# Patient Record
Sex: FEMALE | Race: OTHER | Employment: FULL TIME | URBAN - METROPOLITAN AREA
[De-identification: names, ages, dates, MRNs, and addresses within clinical notes are randomized per-mention and may not be internally consistent; named-entity substitution may affect disease eponyms.]

---

## 2021-05-10 DIAGNOSIS — Z01.818 PRE-OP TESTING: Primary | ICD-10-CM

## 2021-05-10 DIAGNOSIS — M79.89 SOFT TISSUE MASS: ICD-10-CM

## 2021-05-12 ENCOUNTER — HOSPITAL ENCOUNTER (OUTPATIENT)
Age: 36
Discharge: HOME | End: 2021-05-12
Payer: COMMERCIAL

## 2021-05-12 DIAGNOSIS — Z01.818 PRE-OP TESTING: ICD-10-CM

## 2021-05-12 LAB — SARS-COV-2: NOT DETECTED

## 2021-05-12 PROCEDURE — U0005 INFEC AGEN DETEC AMPLI PROBE: HCPCS

## 2021-05-12 PROCEDURE — U0003 INFECTIOUS AGENT DETECTION BY NUCLEIC ACID (DNA OR RNA); SEVERE ACUTE RESPIRATORY SYNDROME CORONAVIRUS 2 (SARS-COV-2) (CORONAVIRUS DISEASE [COVID-19]), AMPLIFIED PROBE TECHNIQUE, MAKING USE OF HIGH THROUGHPUT TECHNOLOGIES AS DESCRIBED BY CMS-2020-01-R: HCPCS

## 2021-05-14 NOTE — PROGRESS NOTES

## 2021-05-17 ENCOUNTER — ANESTHESIA EVENT (OUTPATIENT)
Dept: OPERATING ROOM | Age: 36
End: 2021-05-17
Payer: COMMERCIAL

## 2021-05-19 ENCOUNTER — HOSPITAL ENCOUNTER (OUTPATIENT)
Age: 36
Setting detail: OUTPATIENT SURGERY
Discharge: HOME | End: 2021-05-19
Attending: SURGERY | Admitting: SURGERY
Payer: COMMERCIAL

## 2021-05-19 ENCOUNTER — ANESTHESIA (OUTPATIENT)
Dept: OPERATING ROOM | Age: 36
End: 2021-05-19
Payer: COMMERCIAL

## 2021-05-19 VITALS
RESPIRATION RATE: 22 BRPM | DIASTOLIC BLOOD PRESSURE: 69 MMHG | SYSTOLIC BLOOD PRESSURE: 116 MMHG | OXYGEN SATURATION: 100 %

## 2021-05-19 VITALS
HEART RATE: 70 BPM | HEIGHT: 64 IN | WEIGHT: 140 LBS | SYSTOLIC BLOOD PRESSURE: 133 MMHG | TEMPERATURE: 97.2 F | OXYGEN SATURATION: 100 % | DIASTOLIC BLOOD PRESSURE: 89 MMHG | BODY MASS INDEX: 23.9 KG/M2 | RESPIRATION RATE: 16 BRPM

## 2021-05-19 DIAGNOSIS — R22.31 MASS OF RIGHT AXILLA: Primary | ICD-10-CM

## 2021-05-19 LAB — PREGNANCY, URINE: NEGATIVE

## 2021-05-19 PROCEDURE — 88185 FLOWCYTOMETRY/TC ADD-ON: CPT

## 2021-05-19 PROCEDURE — 2500000003 HC RX 250 WO HCPCS: Performed by: NURSE ANESTHETIST, CERTIFIED REGISTERED

## 2021-05-19 PROCEDURE — 2500000003 HC RX 250 WO HCPCS: Performed by: SURGERY

## 2021-05-19 PROCEDURE — 3600000012 HC SURGERY LEVEL 2 ADDTL 15MIN: Performed by: SURGERY

## 2021-05-19 PROCEDURE — 6360000002 HC RX W HCPCS: Performed by: NURSE ANESTHETIST, CERTIFIED REGISTERED

## 2021-05-19 PROCEDURE — 84703 CHORIONIC GONADOTROPIN ASSAY: CPT

## 2021-05-19 PROCEDURE — 2580000003 HC RX 258: Performed by: ANESTHESIOLOGY

## 2021-05-19 PROCEDURE — 7100000010 HC PHASE II RECOVERY - FIRST 15 MIN: Performed by: SURGERY

## 2021-05-19 PROCEDURE — 7100000011 HC PHASE II RECOVERY - ADDTL 15 MIN: Performed by: SURGERY

## 2021-05-19 PROCEDURE — 38525 BIOPSY/REMOVAL LYMPH NODES: CPT | Performed by: SURGERY

## 2021-05-19 PROCEDURE — 88184 FLOWCYTOMETRY/ TC 1 MARKER: CPT

## 2021-05-19 PROCEDURE — 6370000000 HC RX 637 (ALT 250 FOR IP): Performed by: ANESTHESIOLOGY

## 2021-05-19 PROCEDURE — 3700000001 HC ADD 15 MINUTES (ANESTHESIA): Performed by: SURGERY

## 2021-05-19 PROCEDURE — 2709999900 HC NON-CHARGEABLE SUPPLY: Performed by: SURGERY

## 2021-05-19 PROCEDURE — 3600000002 HC SURGERY LEVEL 2 BASE: Performed by: SURGERY

## 2021-05-19 PROCEDURE — 88305 TISSUE EXAM BY PATHOLOGIST: CPT

## 2021-05-19 PROCEDURE — 3700000000 HC ANESTHESIA ATTENDED CARE: Performed by: SURGERY

## 2021-05-19 RX ORDER — PROMETHAZINE HYDROCHLORIDE 25 MG/ML
6.25 INJECTION, SOLUTION INTRAMUSCULAR; INTRAVENOUS
Status: DISCONTINUED | OUTPATIENT
Start: 2021-05-19 | End: 2021-05-19 | Stop reason: HOSPADM

## 2021-05-19 RX ORDER — LIDOCAINE HYDROCHLORIDE 10 MG/ML
0.3 INJECTION, SOLUTION EPIDURAL; INFILTRATION; INTRACAUDAL; PERINEURAL
Status: DISCONTINUED | OUTPATIENT
Start: 2021-05-19 | End: 2021-05-19 | Stop reason: HOSPADM

## 2021-05-19 RX ORDER — MEPERIDINE HYDROCHLORIDE 25 MG/ML
12.5 INJECTION INTRAMUSCULAR; INTRAVENOUS; SUBCUTANEOUS EVERY 5 MIN PRN
Status: DISCONTINUED | OUTPATIENT
Start: 2021-05-19 | End: 2021-05-19 | Stop reason: HOSPADM

## 2021-05-19 RX ORDER — SODIUM CHLORIDE 0.9 % (FLUSH) 0.9 %
5-40 SYRINGE (ML) INJECTION PRN
Status: DISCONTINUED | OUTPATIENT
Start: 2021-05-19 | End: 2021-05-19 | Stop reason: HOSPADM

## 2021-05-19 RX ORDER — FENTANYL CITRATE 50 UG/ML
INJECTION, SOLUTION INTRAMUSCULAR; INTRAVENOUS PRN
Status: DISCONTINUED | OUTPATIENT
Start: 2021-05-19 | End: 2021-05-19 | Stop reason: SDUPTHER

## 2021-05-19 RX ORDER — ONDANSETRON 2 MG/ML
4 INJECTION INTRAMUSCULAR; INTRAVENOUS
Status: DISCONTINUED | OUTPATIENT
Start: 2021-05-19 | End: 2021-05-19 | Stop reason: HOSPADM

## 2021-05-19 RX ORDER — TRAMADOL HYDROCHLORIDE 50 MG/1
50 TABLET ORAL ONCE
Status: COMPLETED | OUTPATIENT
Start: 2021-05-19 | End: 2021-05-19

## 2021-05-19 RX ORDER — MIDAZOLAM HYDROCHLORIDE 1 MG/ML
INJECTION INTRAMUSCULAR; INTRAVENOUS PRN
Status: DISCONTINUED | OUTPATIENT
Start: 2021-05-19 | End: 2021-05-19 | Stop reason: SDUPTHER

## 2021-05-19 RX ORDER — SODIUM CHLORIDE, SODIUM LACTATE, POTASSIUM CHLORIDE, CALCIUM CHLORIDE 600; 310; 30; 20 MG/100ML; MG/100ML; MG/100ML; MG/100ML
INJECTION, SOLUTION INTRAVENOUS CONTINUOUS
Status: DISCONTINUED | OUTPATIENT
Start: 2021-05-19 | End: 2021-05-19 | Stop reason: HOSPADM

## 2021-05-19 RX ORDER — OXYCODONE HYDROCHLORIDE 5 MG/1
5 TABLET ORAL EVERY 6 HOURS PRN
Qty: 15 TABLET | Refills: 0 | Status: SHIPPED | OUTPATIENT
Start: 2021-05-19 | End: 2021-05-26

## 2021-05-19 RX ORDER — HYDRALAZINE HYDROCHLORIDE 20 MG/ML
5 INJECTION INTRAMUSCULAR; INTRAVENOUS EVERY 10 MIN PRN
Status: DISCONTINUED | OUTPATIENT
Start: 2021-05-19 | End: 2021-05-19 | Stop reason: HOSPADM

## 2021-05-19 RX ORDER — OXYCODONE HYDROCHLORIDE AND ACETAMINOPHEN 5; 325 MG/1; MG/1
1 TABLET ORAL PRN
Status: DISCONTINUED | OUTPATIENT
Start: 2021-05-19 | End: 2021-05-19 | Stop reason: HOSPADM

## 2021-05-19 RX ORDER — ONDANSETRON 2 MG/ML
INJECTION INTRAMUSCULAR; INTRAVENOUS PRN
Status: DISCONTINUED | OUTPATIENT
Start: 2021-05-19 | End: 2021-05-19 | Stop reason: SDUPTHER

## 2021-05-19 RX ORDER — LIDOCAINE HYDROCHLORIDE 20 MG/ML
INJECTION, SOLUTION INFILTRATION; PERINEURAL PRN
Status: DISCONTINUED | OUTPATIENT
Start: 2021-05-19 | End: 2021-05-19 | Stop reason: SDUPTHER

## 2021-05-19 RX ORDER — LABETALOL HYDROCHLORIDE 5 MG/ML
5 INJECTION, SOLUTION INTRAVENOUS EVERY 10 MIN PRN
Status: DISCONTINUED | OUTPATIENT
Start: 2021-05-19 | End: 2021-05-19 | Stop reason: HOSPADM

## 2021-05-19 RX ORDER — MORPHINE SULFATE 10 MG/ML
1 INJECTION, SOLUTION INTRAMUSCULAR; INTRAVENOUS EVERY 5 MIN PRN
Status: DISCONTINUED | OUTPATIENT
Start: 2021-05-19 | End: 2021-05-19 | Stop reason: HOSPADM

## 2021-05-19 RX ORDER — MORPHINE SULFATE 10 MG/ML
2 INJECTION, SOLUTION INTRAMUSCULAR; INTRAVENOUS EVERY 5 MIN PRN
Status: DISCONTINUED | OUTPATIENT
Start: 2021-05-19 | End: 2021-05-19 | Stop reason: HOSPADM

## 2021-05-19 RX ORDER — PROPOFOL 10 MG/ML
INJECTION, EMULSION INTRAVENOUS PRN
Status: DISCONTINUED | OUTPATIENT
Start: 2021-05-19 | End: 2021-05-19 | Stop reason: SDUPTHER

## 2021-05-19 RX ORDER — SODIUM CHLORIDE 9 MG/ML
25 INJECTION, SOLUTION INTRAVENOUS PRN
Status: DISCONTINUED | OUTPATIENT
Start: 2021-05-19 | End: 2021-05-19 | Stop reason: HOSPADM

## 2021-05-19 RX ORDER — KETOROLAC TROMETHAMINE 30 MG/ML
INJECTION, SOLUTION INTRAMUSCULAR; INTRAVENOUS PRN
Status: DISCONTINUED | OUTPATIENT
Start: 2021-05-19 | End: 2021-05-19 | Stop reason: SDUPTHER

## 2021-05-19 RX ORDER — OXYCODONE HYDROCHLORIDE AND ACETAMINOPHEN 5; 325 MG/1; MG/1
2 TABLET ORAL PRN
Status: DISCONTINUED | OUTPATIENT
Start: 2021-05-19 | End: 2021-05-19 | Stop reason: HOSPADM

## 2021-05-19 RX ORDER — SODIUM CHLORIDE 0.9 % (FLUSH) 0.9 %
5-40 SYRINGE (ML) INJECTION EVERY 12 HOURS SCHEDULED
Status: DISCONTINUED | OUTPATIENT
Start: 2021-05-19 | End: 2021-05-19 | Stop reason: HOSPADM

## 2021-05-19 RX ORDER — DIPHENHYDRAMINE HYDROCHLORIDE 50 MG/ML
12.5 INJECTION INTRAMUSCULAR; INTRAVENOUS
Status: DISCONTINUED | OUTPATIENT
Start: 2021-05-19 | End: 2021-05-19 | Stop reason: HOSPADM

## 2021-05-19 RX ADMIN — ONDANSETRON 4 MG: 2 INJECTION, SOLUTION INTRAMUSCULAR; INTRAVENOUS at 10:01

## 2021-05-19 RX ADMIN — SODIUM CHLORIDE, POTASSIUM CHLORIDE, SODIUM LACTATE AND CALCIUM CHLORIDE: 600; 310; 30; 20 INJECTION, SOLUTION INTRAVENOUS at 07:58

## 2021-05-19 RX ADMIN — TRAMADOL HYDROCHLORIDE 50 MG: 50 TABLET, FILM COATED ORAL at 11:12

## 2021-05-19 RX ADMIN — FENTANYL CITRATE 50 MCG: 50 INJECTION INTRAMUSCULAR; INTRAVENOUS at 10:33

## 2021-05-19 RX ADMIN — KETOROLAC TROMETHAMINE 30 MG: 30 INJECTION, SOLUTION INTRAMUSCULAR at 10:22

## 2021-05-19 RX ADMIN — PROPOFOL 400 MG: 10 INJECTION, EMULSION INTRAVENOUS at 10:00

## 2021-05-19 RX ADMIN — PROPOFOL 100 MG: 10 INJECTION, EMULSION INTRAVENOUS at 10:15

## 2021-05-19 RX ADMIN — MIDAZOLAM 1 MG: 1 INJECTION INTRAMUSCULAR; INTRAVENOUS at 09:55

## 2021-05-19 RX ADMIN — LIDOCAINE HYDROCHLORIDE 50 MG: 20 INJECTION, SOLUTION INFILTRATION; PERINEURAL at 10:00

## 2021-05-19 RX ADMIN — FENTANYL CITRATE 50 MCG: 50 INJECTION INTRAMUSCULAR; INTRAVENOUS at 10:30

## 2021-05-19 ASSESSMENT — PULMONARY FUNCTION TESTS
PIF_VALUE: 0

## 2021-05-19 ASSESSMENT — PAIN DESCRIPTION - PAIN TYPE: TYPE: SURGICAL PAIN

## 2021-05-19 ASSESSMENT — PAIN DESCRIPTION - LOCATION: LOCATION: OTHER (COMMENT)

## 2021-05-19 ASSESSMENT — PAIN SCALES - GENERAL: PAINLEVEL_OUTOF10: 0

## 2021-05-19 NOTE — ANESTHESIA POSTPROCEDURE EVALUATION
Department of Anesthesiology  Postprocedure Note    Patient: Merlin Pikes  MRN: [id number]  YOB: 1985  Date of evaluation: 5/19/2021  Time:  12:58 PM     Procedure Summary     Date: 05/19/21 Room / Location: [address] [place] [address] / [facility]    Anesthesia Start: 1584 Anesthesia Stop: 0119    Procedure: EXCISION SOFT TISSUE MASS RIGHT AXILLA (Right Axilla) Diagnosis: (SOFT TISSUE MASS)    Surgeons: Kat Kumar MD Responsible Provider: Aparna Pfeiffer MD    Anesthesia Type: General ASA Status: 1          Anesthesia Type: General    Yahaira Phase I: Yahaira Score: 10    Yahaira Phase II: Yahaira Score: 9    Last vitals: Reviewed and per EMR flowsheets. Anesthesia Post Evaluation    Patient location during evaluation: PACU  Patient participation: complete - patient participated  Level of consciousness: awake and alert  Airway patency: patent  Nausea & Vomiting: no nausea and no vomiting  Complications: no  Cardiovascular status: blood pressure returned to baseline  Respiratory status: acceptable  Hydration status: euvolemic  Comments: VSS on transfer to phase 2 recovery. No anesthetic complications.

## 2021-05-19 NOTE — ANESTHESIA PRE PROCEDURE
Department of Anesthesiology  Preprocedure Note       Name:  Amelie Jacome   Age:  30 y.o.  :  1985                                          MRN:  [id number]         Date:  2021      Surgeon: Emre Walker):  Shakira Mariscal MD    Procedure: Procedure(s):  EXCISION SOFT TISSUE MASS RIGHT AXILLA    Medications prior to admission:   Prior to Admission medications    Not on File       Current medications:    Current Facility-Administered Medications   Medication Dose Route Frequency Provider Last Rate Last Admin    lactated ringers infusion   Intravenous Continuous Alin Cerda  mL/hr at 21 0758 New Bag at 21 0758    sodium chloride flush 0.9 % injection 5-40 mL  5-40 mL Intravenous 2 times per day Alin Cerda MD        sodium chloride flush 0.9 % injection 5-40 mL  5-40 mL Intravenous PRN Alin Cerda MD        0.9 % sodium chloride infusion  25 mL Intravenous PRN Alin Cerda MD        lidocaine PF 1 % injection 0.3 mL  0.3 mL Intradermal Once PRN Alin Cerda MD         Facility-Administered Medications Ordered in Other Encounters   Medication Dose Route Frequency Provider Last Rate Last Admin    lidocaine 2 % injection   Intravenous PRN Everlean Pamlico, APRN - CRNA   50 mg at 21 1000    midazolam (VERSED) injection   Intravenous PRN Everlean Pamlico, APRN - CRNA   1 mg at 21 0955    ondansetron (ZOFRAN) injection   Intravenous PRN Everlean Pamlico, APRN - CRNA   4 mg at 21 1001       Allergies: Allergies   Allergen Reactions    Other      Pt states opioids cause her blood pressure to drop    Pcn [Penicillins]      Not sure        Problem List:    Patient Active Problem List   Diagnosis Code    Chronic abdominal pain R10.9, G89.29    Adnexal cyst N94.9    Soft tissue mass M79.89       Past Medical History:  History reviewed. No pertinent past medical history. Past Surgical History:  History reviewed. No pertinent surgical history. Social History:    Social History     Tobacco Use    Smoking status: Former Smoker    Smokeless tobacco: Never Used   Substance Use Topics    Alcohol use: Yes     Alcohol/week: 0.0 standard drinks     Comment: occass                                Counseling given: Not Answered      Vital Signs (Current):   Vitals:    05/14/21 1520 05/19/21 0748   BP:  115/81   Pulse:  63   Resp:  18   Temp:  98.8 °F (37.1 °C)   TempSrc:  Infrared   SpO2:  100%   Weight: 140 lb (63.5 kg) 140 lb (63.5 kg)   Height: 5' 4\" (1.626 m) 5' 4\" (1.626 m)                                              BP Readings from Last 3 Encounters:   05/19/21 115/81   08/02/17 (!) 155/87   04/07/16 122/68       NPO Status: Time of last liquid consumption: 2000                        Time of last solid consumption: 2000                        Date of last liquid consumption: 05/18/21                        Date of last solid food consumption: 05/18/21    BMI:   Wt Readings from Last 3 Encounters:   05/19/21 140 lb (63.5 kg)   08/02/17 125 lb (56.7 kg)   04/07/16 122 lb (55.3 kg)     Body mass index is 24.03 kg/m². CBC:   Lab Results   Component Value Date    WBC 8.1 03/22/2016    RBC 4.58 03/22/2016    HGB 13.9 03/22/2016    HCT 41.6 03/22/2016    MCV 91.0 03/22/2016    RDW 12.3 03/22/2016     03/22/2016       CMP:   Lab Results   Component Value Date     03/22/2016    K 3.4 03/22/2016     03/22/2016    CO2 26 03/22/2016    BUN 10 03/22/2016    CREATININE 0.8 03/22/2016    GFRAA >60 03/22/2016    AGRATIO 1.4 03/22/2016    LABGLOM >60 03/22/2016    GLUCOSE 79 03/22/2016    PROT 7.1 03/22/2016    CALCIUM 9.0 03/22/2016    BILITOT 0.5 03/22/2016    ALKPHOS 41 03/22/2016    AST 16 03/22/2016    ALT 15 03/22/2016       POC Tests: No results for input(s): POCGLU, POCNA, POCK, POCCL, POCBUN, POCHEMO, POCHCT in the last 72 hours.     Coags: No results found for: PROTIME, INR, APTT    HCG (If Applicable):   Lab Results   Component Value Date PREGTESTUR Negative 05/19/2021        ABGs: No results found for: PHART, PO2ART, IOH5FDS, YQW6YDA, BEART, N1UUTQZO     Type & Screen (If Applicable):  No results found for: LABABO, LABRH    Drug/Infectious Status (If Applicable):  No results found for: HIV, HEPCAB    COVID-19 Screening (If Applicable):   Lab Results   Component Value Date    COVID19 Not Detected 05/12/2021           Anesthesia Evaluation   no history of anesthetic complications:   Airway: Mallampati: II  TM distance: >3 FB   Neck ROM: full  Mouth opening: > = 3 FB Dental: normal exam         Pulmonary:Negative Pulmonary ROS                              Cardiovascular:Negative CV ROS                      Neuro/Psych:   Negative Neuro/Psych ROS              GI/Hepatic/Renal: Neg GI/Hepatic/Renal ROS            Endo/Other: Negative Endo/Other ROS                    Abdominal:           Vascular: negative vascular ROS. Anesthesia Plan      MAC     ASA 1     (Risks, benefits and alternatives of MAC anesthesia discussed with pt by CRNA. I agree with plan. Questions answered. Willing to proceed.)  Induction: intravenous. Anesthetic plan and risks discussed with patient.                       Maria Victoria Barros MD   5/19/2021

## 2021-05-19 NOTE — BRIEF OP NOTE
Brief Postoperative Note      Patient: Kindra Malone  YOB: 1985  MRN: [id number]    Date of Procedure: 5/19/2021    Pre-Op Diagnosis: SOFT TISSUE MASS    Post-Op Diagnosis: Same with lymphadenopathy       Procedure(s):  EXCISION SOFT TISSUE MASS RIGHT AXILLA    Surgeon(s):  Murtaza Maravilla MD    Assistant:  Surgical Assistant: Frandy Loya    Anesthesia: Monitor Anesthesia Care    Estimated Blood Loss (mL): Minimal    Complications: None    Specimens:   ID Type Source Tests Collected by Time Destination   A : RIGHT AXILLA MASS [facility] SURGICAL PATHOLOGY Murtaza Maravilla MD 5/19/2021 1014        Implants:  * No implants in log *      Drains: * No LDAs found *    Findings: As above    Electronically signed by Brenda Benoit MD on 5/19/2021 at 10:29 AM

## 2021-05-19 NOTE — H&P
Department of General Surgery - Adult  Surgical Service   Attending History and Physical        CHIEF COMPLAINT:  R axilla mass      History Obtained From:  patient    HISTORY OF PRESENT ILLNESS:    This patient is a 30 y.o. female who presents with lump R axilla that is enlarging and causing some acute pain and skin sensation disturbance. Past Medical History:    History reviewed. No pertinent past medical history. Past Surgical History:    History reviewed. No pertinent surgical history. Current Medications:  Current Facility-Administered Medications   Medication Dose Route Frequency Provider Last Rate Last Admin    lactated ringers infusion   Intravenous Continuous Ellen Boateng  mL/hr at 05/19/21 0758 New Bag at 05/19/21 0758    sodium chloride flush 0.9 % injection 5-40 mL  5-40 mL Intravenous 2 times per day Ellen Boateng MD        sodium chloride flush 0.9 % injection 5-40 mL  5-40 mL Intravenous PRN Ellen Boateng MD        0.9 % sodium chloride infusion  25 mL Intravenous PRN Ellen Boateng MD        lidocaine PF 1 % injection 0.3 mL  0.3 mL Intradermal Once PRN Ellen Boateng MD         Home Medications:  Prior to Admission medications    Not on File     Allergies: Other and Pcn [penicillins]      Social History:   TOBACCO:   reports that she has quit smoking. She has never used smokeless tobacco.  ETOH:   reports current alcohol use. Family History:       Problem Relation Age of Onset    Cancer Mother         hysterectomy      REVIEW OF SYSTEMS:    Patient reports no complaints related to eyes, ears, nose , throat or mouth. No chest pain or SOB. No urinary complaints or musculoskeletal complaints. No skin rashes, bleeding tendencies. Current GI complaints none.     PHYSICAL EXAM:    VITALS:  /81   Pulse 63   Temp 98.8 °F (37.1 °C) (Infrared)   Resp 18   Ht 5' 4\" (1.626 m)   Wt 140 lb (63.5 kg)   LMP 05/03/2021   SpO2 100%   Breastfeeding Yes   BMI

## 2021-05-21 ENCOUNTER — TELEPHONE (OUTPATIENT)
Dept: SURGERY | Age: 36
End: 2021-05-21

## 2021-05-21 NOTE — TELEPHONE ENCOUNTER
----- Message from Jose Cruz Carbajal MD sent at 5/20/2021  2:32 PM EDT -----  Tell the patient that the lump removed from R axilla was a lymph node and was benign. Follow up prn.

## 2021-05-22 NOTE — OP NOTE
[address]                 [address] [address]                                OPERATIVE REPORT    PATIENT NAME: Janae Adame                        :        1985  MED REC NO:   [id number]                          ROOM:  ACCOUNT NO:   [de-identified]                           ADMIT DATE: 2021  PROVIDER:     Yonis Trotter MD    DATE OF PROCEDURE:  2021    PREOPERATIVE DIAGNOSIS:  Right axilla mass. POSTOPERATIVE DIAGNOSIS:  Right axilla lymphadenopathy. OPERATION PERFORMED:  Excision of right axilla, mass which was a lymph  node. SURGEON:  Yonis Trotter MD    ANESTHESIA:  Total intravenous anesthesia. COMPLICATIONS:  None. ESTIMATED BLOOD LOSS:  Less than 50 mL. INDICATIONS FOR OPERATION:  A 80-year-old female who has noticed a mass  in the right axilla. It is enlarged. It is causing acute pain at  times. I recommended operative excision for tissue diagnosis. The  risks and benefits were explained. The patient understood them,  accepted them, and elected to proceed. DESCRIPTION OF OPERATION:  The patient was brought to the operating  room. Total intravenous anesthesia was initiated. She was prepped and  draped in usual surgical sterile fashion. Local anesthetic was  infiltrated. An incision was made overlying the mass. I dissected  down. The mass was palpated deep through the subcutaneous tissues. It  was a soft, but enlarged lymph node. Feeding vessels and lymphatics  were tied off. The mass was passed off as specimen. Hemostasis was  obtained. A layered Vicryl closure was performed including 4-0 Vicryl  at the skin. Benzoin and Steri-Strip dressing were placed. DISPOSITION:  The patient tolerated the procedure without any acute  complication.         Staci Mercedes MD    D: 2021 16:13:56       T: 2021 16:25:49     [email or web address]  Job#: [id number]     Doc#: [id number]    CC:

## 2021-06-24 ENCOUNTER — HOSPITAL ENCOUNTER (EMERGENCY)
Age: 36
Discharge: HOME | End: 2021-06-24
Attending: EMERGENCY MEDICINE
Payer: COMMERCIAL

## 2021-06-24 ENCOUNTER — APPOINTMENT (OUTPATIENT)
Dept: CT IMAGING | Age: 36
End: 2021-06-24
Payer: COMMERCIAL

## 2021-06-24 VITALS
TEMPERATURE: 97.3 F | OXYGEN SATURATION: 100 % | RESPIRATION RATE: 16 BRPM | WEIGHT: 130 LBS | DIASTOLIC BLOOD PRESSURE: 78 MMHG | HEIGHT: 64 IN | SYSTOLIC BLOOD PRESSURE: 116 MMHG | HEART RATE: 59 BPM | BODY MASS INDEX: 22.2 KG/M2
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
## 2021-06-24 DIAGNOSIS — R51.9 ACUTE NONINTRACTABLE HEADACHE, UNSPECIFIED HEADACHE TYPE: ICD-10-CM

## 2021-06-24 DIAGNOSIS — T17.308A CHOKING, INITIAL ENCOUNTER: ICD-10-CM
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
## 2021-06-24 DIAGNOSIS — M25.512 ACUTE PAIN OF LEFT SHOULDER: ICD-10-CM

## 2021-06-24 DIAGNOSIS — I67.1 BRAIN ANEURYSM: ICD-10-CM

## 2021-06-24 DIAGNOSIS — M54.2 NECK PAIN: ICD-10-CM

## 2021-06-24 DIAGNOSIS — S06.0X0A CONCUSSION WITHOUT LOSS OF CONSCIOUSNESS, INITIAL ENCOUNTER: ICD-10-CM

## 2021-06-24 LAB
A/G RATIO: 1.3 (ref 1.1–2.2)
ALBUMIN SERPL-MCNC: 4.8 G/DL (ref 3.4–5)
ALP BLD-CCNC: 75 U/L (ref 40–129)
ALT SERPL-CCNC: 14 U/L (ref 10–40)
ANION GAP SERPL CALCULATED.3IONS-SCNC: 10 MMOL/L (ref 3–16)
AST SERPL-CCNC: 12 U/L (ref 15–37)
BILIRUB SERPL-MCNC: <0.2 MG/DL (ref 0–1)
BUN BLDV-MCNC: 12 MG/DL (ref 7–20)
CALCIUM SERPL-MCNC: 9.6 MG/DL (ref 8.3–10.6)
CHLORIDE BLD-SCNC: 103 MMOL/L (ref 99–110)
CO2: 23 MMOL/L (ref 21–32)
CREAT SERPL-MCNC: 0.6 MG/DL (ref 0.6–1.1)
GFR AFRICAN AMERICAN: >60
GFR NON-AFRICAN AMERICAN: >60
GLOBULIN: 3.6 G/DL
GLUCOSE BLD-MCNC: 97 MG/DL (ref 70–99)
HCG QUALITATIVE: NEGATIVE
POTASSIUM SERPL-SCNC: 3.8 MMOL/L (ref 3.5–5.1)
SODIUM BLD-SCNC: 136 MMOL/L (ref 136–145)
TOTAL PROTEIN: 8.4 G/DL (ref 6.4–8.2)

## 2021-06-24 PROCEDURE — 70498 CT ANGIOGRAPHY NECK: CPT

## 2021-06-24 PROCEDURE — 6360000004 HC RX CONTRAST MEDICATION: Performed by: EMERGENCY MEDICINE

## 2021-06-24 PROCEDURE — 6370000000 HC RX 637 (ALT 250 FOR IP): Performed by: EMERGENCY MEDICINE

## 2021-06-24 PROCEDURE — 99283 EMERGENCY DEPT VISIT LOW MDM: CPT

## 2021-06-24 PROCEDURE — 80053 COMPREHEN METABOLIC PANEL: CPT

## 2021-06-24 PROCEDURE — 84703 CHORIONIC GONADOTROPIN ASSAY: CPT

## 2021-06-24 RX ORDER — METHOCARBAMOL 500 MG/1
500-1000 TABLET, FILM COATED ORAL 2 TIMES DAILY PRN
Qty: 12 TABLET | Refills: 0 | Status: SHIPPED | OUTPATIENT
Start: 2021-06-24 | End: 2021-06-27

## 2021-06-24 RX ORDER — ACETAMINOPHEN 325 MG/1
650 TABLET ORAL ONCE
Status: COMPLETED | OUTPATIENT
Start: 2021-06-24 | End: 2021-06-24

## 2021-06-24 RX ORDER — IBUPROFEN 600 MG/1
600 TABLET ORAL ONCE
Status: COMPLETED | OUTPATIENT
Start: 2021-06-24 | End: 2021-06-24

## 2021-06-24 RX ADMIN — IBUPROFEN 600 MG: 600 TABLET, FILM COATED ORAL at 14:11

## 2021-06-24 RX ADMIN — IOPAMIDOL 85 ML: 755 INJECTION, SOLUTION INTRAVENOUS at 15:19

## 2021-06-24 RX ADMIN — ACETAMINOPHEN 650 MG: 325 TABLET ORAL at 14:11

## 2021-06-24 ASSESSMENT — PAIN DESCRIPTION - PAIN TYPE
TYPE: ACUTE PAIN
TYPE: ACUTE PAIN

## 2021-06-24 ASSESSMENT — PAIN DESCRIPTION - LOCATION
LOCATION: NECK;BACK;HEAD
LOCATION: NECK;HEAD

## 2021-06-24 ASSESSMENT — ENCOUNTER SYMPTOMS
EYE PAIN: 0
NAUSEA: 0
BACK PAIN: 0
PHOTOPHOBIA: 0
ABDOMINAL PAIN: 0
VOMITING: 0
SHORTNESS OF BREATH: 0
COLOR CHANGE: 1
EYE REDNESS: 0

## 2021-06-24 ASSESSMENT — PAIN DESCRIPTION - ONSET: ONSET: SUDDEN

## 2021-06-24 ASSESSMENT — PAIN SCALES - GENERAL
PAINLEVEL_OUTOF10: 6
PAINLEVEL_OUTOF10: 8
PAINLEVEL_OUTOF10: 8

## 2021-06-24 ASSESSMENT — PAIN DESCRIPTION - DESCRIPTORS: DESCRIPTORS: DULL

## 2021-06-24 ASSESSMENT — PAIN DESCRIPTION - FREQUENCY: FREQUENCY: CONTINUOUS

## 2021-06-24 NOTE — FLOWSHEET NOTE
06/24/21 1309   Encounter Summary   Services provided to: Patient   Referral/Consult From: Multi-disciplinary team   Continue Visiting   (6/24 Emotional support, prayer)   Complexity of Encounter Low   Length of Encounter 30 minutes   Spiritual Assessment Completed Yes   Crisis   Type Emotional distress   Assessment Approachable;Tearful; Anxious; Hopeful; Shock   Intervention Prayer;Explored feelings, thoughts, concerns; Active listening;Sustaining presence/ Ministry of presence; Discussed illness/injury and it's impact; Discussed meaning/purpose;Discussed belief system/Rastafarian practices/jonah;Discussed relationship with God   Outcome Comfort;Expressed gratitude;Engaged in conversation; Shared life review;Encouraged; Hopeful

## 2021-06-24 NOTE — ED NOTES
1633- Call placed to transfer center for Neuro -surgery for consult.      1652- Call was returned with Flavio from neuro and spoke to Dr. Severa Cornwall  06/24/21 [address]  06/24/21 6994
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
## 2021-06-28 ENCOUNTER — OFFICE VISIT (OUTPATIENT)
Dept: INTERNAL MEDICINE CLINIC | Age: 36
End: 2021-06-28

## 2021-06-28 VITALS
HEIGHT: 64 IN | BODY MASS INDEX: 23.39 KG/M2 | WEIGHT: 137 LBS | TEMPERATURE: 98.5 F | DIASTOLIC BLOOD PRESSURE: 66 MMHG | OXYGEN SATURATION: 99 % | SYSTOLIC BLOOD PRESSURE: 122 MMHG | HEART RATE: 90 BPM
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
## 2021-06-28 DIAGNOSIS — J02.9 PHARYNGITIS WITH VIRAL SYNDROME: ICD-10-CM

## 2021-06-28 DIAGNOSIS — B34.9 PHARYNGITIS WITH VIRAL SYNDROME: ICD-10-CM

## 2021-06-28 DIAGNOSIS — Z00.00 ANNUAL PHYSICAL EXAM: Primary | ICD-10-CM

## 2021-06-28 DIAGNOSIS — I67.1 ANEURYSM OF ANTERIOR COMMUNICATING ARTERY: ICD-10-CM

## 2021-06-28 LAB
BASOPHILS ABSOLUTE: 0 K/UL (ref 0–0.2)
BASOPHILS RELATIVE PERCENT: 0.3 %
CHOLESTEROL, TOTAL: 211 MG/DL (ref 0–199)
EOSINOPHILS ABSOLUTE: 0.1 K/UL (ref 0–0.6)
EOSINOPHILS RELATIVE PERCENT: 1.8 %
HCT VFR BLD CALC: 41.8 % (ref 36–48)
HDLC SERPL-MCNC: 36 MG/DL (ref 40–60)
HEMOGLOBIN: 14.2 G/DL (ref 12–16)
LDL CHOLESTEROL CALCULATED: 117 MG/DL
LYMPHOCYTES ABSOLUTE: 0.7 K/UL (ref 1–5.1)
LYMPHOCYTES RELATIVE PERCENT: 8.8 %
MCH RBC QN AUTO: 31 PG (ref 26–34)
MCHC RBC AUTO-ENTMCNC: 34.1 G/DL (ref 31–36)
MCV RBC AUTO: 91 FL (ref 80–100)
MONOCYTES ABSOLUTE: 0.5 K/UL (ref 0–1.3)
MONOCYTES RELATIVE PERCENT: 6.5 %
NEUTROPHILS ABSOLUTE: 6.2 K/UL (ref 1.7–7.7)
NEUTROPHILS RELATIVE PERCENT: 82.6 %
PDW BLD-RTO: 12.3 % (ref 12.4–15.4)
PLATELET # BLD: 188 K/UL (ref 135–450)
PMV BLD AUTO: 8.8 FL (ref 5–10.5)
RBC # BLD: 4.59 M/UL (ref 4–5.2)
TRIGL SERPL-MCNC: 289 MG/DL (ref 0–150)
VLDLC SERPL CALC-MCNC: 58 MG/DL
WBC # BLD: 7.5 K/UL (ref 4–11)

## 2021-06-28 PROCEDURE — 99385 PREV VISIT NEW AGE 18-39: CPT | Performed by: INTERNAL MEDICINE

## 2021-06-28 PROCEDURE — 36415 COLL VENOUS BLD VENIPUNCTURE: CPT | Performed by: INTERNAL MEDICINE

## 2021-06-28 PROCEDURE — 99203 OFFICE O/P NEW LOW 30 MIN: CPT | Performed by: INTERNAL MEDICINE

## 2021-06-28 SDOH — ECONOMIC STABILITY: FOOD INSECURITY: WITHIN THE PAST 12 MONTHS, THE FOOD YOU BOUGHT JUST DIDN'T LAST AND YOU DIDN'T HAVE MONEY TO GET MORE.: NEVER TRUE

## 2021-06-28 SDOH — ECONOMIC STABILITY: FOOD INSECURITY: WITHIN THE PAST 12 MONTHS, YOU WORRIED THAT YOUR FOOD WOULD RUN OUT BEFORE YOU GOT MONEY TO BUY MORE.: NEVER TRUE

## 2021-06-28 ASSESSMENT — PATIENT HEALTH QUESTIONNAIRE - PHQ9
SUM OF ALL RESPONSES TO PHQ QUESTIONS 1-9: 1
SUM OF ALL RESPONSES TO PHQ9 QUESTIONS 1 & 2: 1
SUM OF ALL RESPONSES TO PHQ QUESTIONS 1-9: 1
SUM OF ALL RESPONSES TO PHQ QUESTIONS 1-9: 1
1. LITTLE INTEREST OR PLEASURE IN DOING THINGS: 0
2. FEELING DOWN, DEPRESSED OR HOPELESS: 1

## 2021-06-28 ASSESSMENT — SOCIAL DETERMINANTS OF HEALTH (SDOH): HOW HARD IS IT FOR YOU TO PAY FOR THE VERY BASICS LIKE FOOD, HOUSING, MEDICAL CARE, AND HEATING?: NOT HARD AT ALL
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
## 2021-06-29 NOTE — RESULT ENCOUNTER NOTE
Inform patient:  Your cholesterol and triglyceride are a little high which means your body has a difficult time digesting carbohydrates due to insulin resistance which may lead to diabetes in the future. Exercising more 30 minutes 5 days a week and cutting down on portion size along with a low cholesterol and low carbohydrates (avoiding sweets/alcohol) to lose weight will help reduce your cholesterol and triglyceride and decrease the risk of diabetes in the future. Your blood count is normal.  [facility].

## 2021-07-01 ENCOUNTER — CLINICAL DOCUMENTATION (OUTPATIENT)
Dept: SPIRITUAL SERVICES | Age: 36
End: 2021-07-01

## 2021-07-01 NOTE — PROGRESS NOTES
Visited Ofelia at [address] [address]. Called to follow-up for spiritual support. Didn't answer phone, left voice message. Will attempt another call at a later date.

## 2021-12-21 ENCOUNTER — HOSPITAL ENCOUNTER (OUTPATIENT)
Dept: MRI IMAGING | Age: 36
Discharge: HOME | End: 2021-12-21
Payer: COMMERCIAL

## 2021-12-21 DIAGNOSIS — I67.1 CEREBRAL ANEURYSM, NONRUPTURED: ICD-10-CM

## 2021-12-21 PROCEDURE — 70544 MR ANGIOGRAPHY HEAD W/O DYE: CPT

## 2022-04-14 ENCOUNTER — OFFICE VISIT (OUTPATIENT)
Dept: ENT CLINIC | Age: 37
End: 2022-04-14
Payer: COMMERCIAL

## 2022-04-14 VITALS
BODY MASS INDEX: 24.75 KG/M2 | HEIGHT: 64 IN | SYSTOLIC BLOOD PRESSURE: 109 MMHG | WEIGHT: 145 LBS | DIASTOLIC BLOOD PRESSURE: 73 MMHG | TEMPERATURE: 97.6 F | HEART RATE: 65 BPM

## 2022-04-14 DIAGNOSIS — J04.0 LARYNGITIS: Primary | ICD-10-CM

## 2022-04-14 PROCEDURE — 99204 OFFICE O/P NEW MOD 45 MIN: CPT | Performed by: OTOLARYNGOLOGY

## 2022-04-14 RX ORDER — CLINDAMYCIN HYDROCHLORIDE 300 MG/1
300 CAPSULE ORAL 3 TIMES DAILY
Qty: 21 CAPSULE | Refills: 0 | Status: SHIPPED | OUTPATIENT
Start: 2022-04-14 | End: 2022-04-21

## 2022-04-14 RX ORDER — PREDNISONE 20 MG/1
40 TABLET ORAL DAILY
Qty: 10 TABLET | Refills: 0 | Status: SHIPPED | OUTPATIENT
Start: 2022-04-14 | End: 2022-04-19

## 2022-04-14 ASSESSMENT — ENCOUNTER SYMPTOMS
COUGH: 0
FACIAL SWELLING: 0
SORE THROAT: 1
EYE ITCHING: 0
SINUS PRESSURE: 0
SHORTNESS OF BREATH: 0
VOICE CHANGE: 1
APNEA: 0
TROUBLE SWALLOWING: 0

## 2022-04-14 NOTE — PROGRESS NOTES
Sujey Robert [address], [address], [address] [place] [address]  P: [phone]       Patient     Wei Hayes Page  1985    ChiefComplaint     Chief Complaint   Patient presents with    Pharyngitis     Patient states she has had a sore throat for 5 days now. She is also having a lot of phlegm. She also states she lost her voice for 3 days. History of Present Illness     Neptali Navarrete is a 31-year-old female here today for evaluation of loss of voice. Onset 5 days ago. Reports her child was sick for 1 day and shortly thereafter she became sick. Symptoms have not improved. Reports low-grade fever, loss of voice, sore throat and significant postnasal drainage. Past Medical History     History reviewed. No pertinent past medical history. Past Surgical History     Past Surgical History:   Procedure Laterality Date    ARM SURGERY Right 5/19/2021    EXCISION SOFT TISSUE MASS RIGHT AXILLA performed by Mabel Davila MD at [address] OR       Family History     Family History   Problem Relation Age of Onset    Cancer Mother         uterine     High Cholesterol Mother     Other Sister         Charles Hilding     High Cholesterol Sister        Social History     Social History     Tobacco Use    Smoking status: Never Smoker    Smokeless tobacco: Never Used   Vaping Use    Vaping Use: Never used   Substance Use Topics    Alcohol use: Not Currently    Drug use: No        Allergies     Allergies   Allergen Reactions    Other      Pt states opioids cause her blood pressure to drop    Pcn [Penicillins]      Not sure        Medications     Current Outpatient Medications   Medication Sig Dispense Refill    predniSONE (DELTASONE) 20 MG tablet Take 2 tablets by mouth daily for 5 days 10 tablet 0    clindamycin (CLEOCIN) 300 MG capsule Take 1 capsule by mouth 3 times daily for 7 days 21 capsule 0     No current facility-administered medications for this visit.        Review of Systems Review of Systems   Constitutional: Negative for appetite change, chills, fatigue, fever and unexpected weight change. HENT: Positive for postnasal drip, sore throat and voice change. Negative for congestion, ear discharge, ear pain, facial swelling, hearing loss, nosebleeds, sinus pressure, sneezing, tinnitus and trouble swallowing. Eyes: Negative for itching. Respiratory: Negative for apnea, cough and shortness of breath. Endocrine: Negative for cold intolerance and heat intolerance. Musculoskeletal: Negative for myalgias and neck pain. Skin: Negative for rash. Allergic/Immunologic: Negative for environmental allergies. Neurological: Negative for dizziness and headaches. Psychiatric/Behavioral: Negative for confusion, decreased concentration and sleep disturbance. PhysicalExam     Vitals:    04/14/22 1107   BP: 109/73   Site: Right Upper Arm   Position: Sitting   Pulse: 65   Temp: 97.6 °F (36.4 °C)   TempSrc: Infrared   Weight: 145 lb (65.8 kg)   Height: 5' 4\" (1.626 m)       Physical Exam  Constitutional:       General: She is not in acute distress. Appearance: She is well-developed. HENT:      Head: Normocephalic and atraumatic. Right Ear: Tympanic membrane, ear canal and external ear normal. No drainage. No middle ear effusion. Tympanic membrane is not bulging. Tympanic membrane has normal mobility. Left Ear: Tympanic membrane, ear canal and external ear normal. No drainage. No middle ear effusion. Tympanic membrane is not bulging. Tympanic membrane has normal mobility. Nose: No mucosal edema or rhinorrhea. Right Turbinates: Swollen. Left Turbinates: Swollen. Mouth/Throat:      Lips: Pink. Mouth: Mucous membranes are moist.      Tongue: No lesions. Palate: No mass. Pharynx: Uvula midline. Tonsils: 2+ on the right. 2+ on the left. Comments: Mirror exam was performed.   The nasopharynx, larynx,or hypopharynx were examined. Vocal fold motion was examined. Pertinent findings include: erythema bilateral TVC  Eyes:      Pupils: Pupils are equal, round, and reactive to light. Neck:      Thyroid: No thyroid mass or thyromegaly. Trachea: Trachea and phonation normal.   Cardiovascular:      Pulses: Normal pulses. Pulmonary:      Effort: Pulmonary effort is normal. No accessory muscle usage or respiratory distress. Breath sounds: No stridor. Musculoskeletal:      Cervical back: Full passive range of motion without pain. Lymphadenopathy:      Head:      Right side of head: No submental or submandibular adenopathy. Left side of head: No submental or submandibular adenopathy. Cervical: No cervical adenopathy. Right cervical: No superficial, deep or posterior cervical adenopathy. Left cervical: No superficial, deep or posterior cervical adenopathy. Skin:     General: Skin is warm and dry. Neurological:      Mental Status: She is alert and oriented to person, place, and time. Cranial Nerves: No cranial nerve deficit. Coordination: Coordination normal.      Gait: Gait normal.   Psychiatric:         Thought Content: Thought content normal.           Assessment and Plan     1. Laryngitis    - predniSONE (DELTASONE) 20 MG tablet; Take 2 tablets by mouth daily for 5 days  Dispense: 10 tablet; Refill: 0  - clindamycin (CLEOCIN) 300 MG capsule; Take 1 capsule by mouth 3 times daily for 7 days  Dispense: 21 capsule; Refill: 0      Return in 1 week if symptoms fail to improve    Barbara Jaquez DO  4/14/22      Portions of this note were dictated using Dragon.  There may be linguistic errors secondary to the use of this program.

## 2022-05-09 ENCOUNTER — TELEPHONE (OUTPATIENT)
Dept: INTERNAL MEDICINE CLINIC | Age: 37
End: 2022-05-09

## 2022-05-09 NOTE — TELEPHONE ENCOUNTER
----- Message from Howard Salmon sent at 5/6/2022  2:33 PM EDT -----  Subject: Appointment Request    Reason for Call: Urgent Abdominal Pain    QUESTIONS  Type of Appointment? Established Patient  Reason for appointment request? No appointments available during search  Additional Information for Provider? Patient is experiencing stomach pains   for week, she stated she is cramping and recently was on antibiotic, she   has some GI questions/concerns she would like to speak with   nurse/provider. Please contact patient to advise.   ---------------------------------------------------------------------------  --------------  CALL BACK INFO  What is the best way for the office to contact you? OK to leave message on   voicemail  Preferred Call Back Phone Number? [phone]  ---------------------------------------------------------------------------  --------------  SCRIPT ANSWERS  Relationship to Patient? Self  Do you have pain that has started or worsened within the past 24 hours? No  Are you vomiting blood or have bloody or black stool? No  Have you recently (14 days) seen a provider for this pain? No  Have you been diagnosed with, awaiting test results for, or told that you   are suspected of having COVID-19 (Coronavirus)? (If patient has tested   negative or was tested as a requirement for work, school, or travel and   not based on symptoms, answer no)? No  Within the past 10 days have you developed any of the following symptoms   (answer no if symptoms have been present longer than 10 days or began   more than 10 days ago)? Fever or Chills, Cough, Shortness of breath or   difficulty breathing, Loss of taste or smell, Sore throat, Nasal   congestion, Sneezing or runny nose, Fatigue or generalized body aches   (answer no if pain is specific to a body part e.g. back pain), Diarrhea,   Headache? No  Have you had close contact with someone with COVID-19 in the last 7 days?    No  (Service Expert  click yes below to proceed with [employer] As Usual   Scheduling)?  Yes

## 2022-05-09 NOTE — TELEPHONE ENCOUNTER
Spoke with patient, she said that the symptoms come and go and they seem to be getting better today. She does not want to come into the office or do a virtual visit today. She will call if symptoms change or worsen. Advised VV's today and tomorrow available.

## 2022-08-11 ENCOUNTER — HOSPITAL ENCOUNTER (OUTPATIENT)
Dept: MRI IMAGING | Age: 37
Discharge: HOME | End: 2022-08-11
Payer: COMMERCIAL

## 2022-08-11 DIAGNOSIS — I67.1 CEREBRAL ANEURYSM, NONRUPTURED: ICD-10-CM

## 2022-08-11 PROCEDURE — 70544 MR ANGIOGRAPHY HEAD W/O DYE: CPT

## 2022-12-27 ENCOUNTER — OFFICE VISIT (OUTPATIENT)
Dept: ENT CLINIC | Age: 37
End: 2022-12-27

## 2022-12-27 VITALS
WEIGHT: 145 LBS | DIASTOLIC BLOOD PRESSURE: 85 MMHG | HEART RATE: 64 BPM | SYSTOLIC BLOOD PRESSURE: 110 MMHG | TEMPERATURE: 97.4 F | OXYGEN SATURATION: 99 % | BODY MASS INDEX: 24.75 KG/M2 | HEIGHT: 64 IN

## 2022-12-27 DIAGNOSIS — J01.90 ACUTE RHINOSINUSITIS: ICD-10-CM

## 2022-12-27 DIAGNOSIS — K21.9 LARYNGOPHARYNGEAL REFLUX (LPR): ICD-10-CM

## 2022-12-27 DIAGNOSIS — R49.0 HOARSENESS OF VOICE: Primary | ICD-10-CM

## 2022-12-27 RX ORDER — FLUTICASONE PROPIONATE 50 MCG
1 SPRAY, SUSPENSION (ML) NASAL 2 TIMES DAILY
Qty: 16 G | Refills: 3 | Status: SHIPPED | OUTPATIENT
Start: 2022-12-27

## 2022-12-27 RX ORDER — PREDNISONE 20 MG/1
20 TABLET ORAL 2 TIMES DAILY
Qty: 10 TABLET | Refills: 0 | Status: SHIPPED | OUTPATIENT
Start: 2022-12-27 | End: 2023-01-01

## 2022-12-27 RX ORDER — OMEPRAZOLE 20 MG/1
20 CAPSULE, DELAYED RELEASE ORAL
Qty: 180 CAPSULE | Refills: 1 | Status: SHIPPED | OUTPATIENT
Start: 2022-12-27

## 2022-12-27 RX ORDER — DOXYCYCLINE HYCLATE 100 MG
100 TABLET ORAL 2 TIMES DAILY
Qty: 14 TABLET | Refills: 0 | Status: SHIPPED | OUTPATIENT
Start: 2022-12-27 | End: 2023-01-03

## 2022-12-27 RX ORDER — SODIUM CHLORIDE/SODIUM BICARB
1 PACKET (EA) NASAL 2 TIMES DAILY
Qty: 200 EACH | Refills: 1 | Status: SHIPPED | OUTPATIENT
Start: 2022-12-27

## 2022-12-27 ASSESSMENT — ENCOUNTER SYMPTOMS
FACIAL SWELLING: 0
SHORTNESS OF BREATH: 0
TROUBLE SWALLOWING: 0
VOICE CHANGE: 1
SINUS PRESSURE: 0
SORE THROAT: 1
APNEA: 0
COUGH: 0
EYE ITCHING: 0

## 2022-12-27 NOTE — PROGRESS NOTES
Sujey Robert [address], [address], [address] [place] [address]  P: [phone]       Patient     Bong Thomas. Jorge Numbers  1985    ChiefComplaint     Chief Complaint   Patient presents with    New Patient     Patient is here today for hoarseness. Patient states she has been hoarse for three days now. She states she  is very congested, and has thick green mucous. She has some drainage down the back of throat. Patient states she has been  blowing her nose so much and bad there is air coming out of the corner of left eye, also some popping from corner of left eye. She does have sick child at home. History of Present Illness     Promise Murray is a 76-year-old female here today for evaluation of loss of voice. Onset 5 days ago. Reports her child was sick for 1 day and shortly thereafter she became sick. Symptoms have not improved. Reports low-grade fever, loss of voice, sore throat and significant postnasal drainage. Interval history 12/27/2022: Last seen by Dr. Honey Meade 04/2022 with concern for hoarseness in the setting of laryngitis. Prescribed prednisone. Sore throat for 1.5 weeks approximately 3 weeks ago, had hoarse voice x 5 days, then resolved. 2 days ago she had recurrence of hoarse voice - no sore throat, no fevers, chills, night sweats - has waterbrash and sour taste in mouth (reflux), occasional throat spasms. States 4 days of thick green mucus from right nasal passage. Significant throat dryness. Drinks 2 cups of coffee/week, minimal soda. Past Medical History     No past medical history on file.     Past Surgical History     Past Surgical History:   Procedure Laterality Date    ARM SURGERY Right 5/19/2021    EXCISION SOFT TISSUE MASS RIGHT AXILLA performed by Lashawn Yo MD at [facility] OR       Family History     Family History   Problem Relation Age of Onset    Cancer Mother         uterine     High Cholesterol Mother     Other Sister         Lonn Ly     High Cholesterol Sister        Social History     Social History     Tobacco Use    Smoking status: Never    Smokeless tobacco: Never   Vaping Use    Vaping Use: Never used   Substance Use Topics    Alcohol use: Not Currently    Drug use: No        Allergies     Allergies   Allergen Reactions    Other      Pt states opioids cause her blood pressure to drop    Pcn [Penicillins]      Not sure        Medications     Current Outpatient Medications   Medication Sig Dispense Refill    predniSONE (DELTASONE) 20 MG tablet Take 1 tablet by mouth 2 times daily for 5 days 10 tablet 0    fluticasone (FLONASE) 50 MCG/ACT nasal spray 1 spray by Each Nostril route in the morning and at bedtime 16 g 3    omeprazole (PRILOSEC) 20 MG delayed release capsule Take 1 capsule by mouth 2 times daily (before meals) 180 capsule 1    doxycycline hyclate (VIBRA-TABS) 100 MG tablet Take 1 tablet by mouth 2 times daily for 7 days 14 tablet 0    Hypertonic Nasal Wash (SINUS RINSE REFILL) PACK 1 packet by Nasal route 2 times daily 200 each 1     No current facility-administered medications for this visit. Review of Systems     Review of Systems   Constitutional:  Negative for appetite change, chills, fatigue, fever and unexpected weight change. HENT:  Positive for postnasal drip, sore throat and voice change. Negative for congestion, ear discharge, ear pain, facial swelling, hearing loss, nosebleeds, sinus pressure, sneezing, tinnitus and trouble swallowing. Eyes:  Negative for itching. Respiratory:  Negative for apnea, cough and shortness of breath. Endocrine: Negative for cold intolerance and heat intolerance. Musculoskeletal:  Negative for myalgias and neck pain. Skin:  Negative for rash. Allergic/Immunologic: Negative for environmental allergies. Neurological:  Negative for dizziness and headaches. Psychiatric/Behavioral:  Negative for confusion, decreased concentration and sleep disturbance.         PhysicalExam Vitals:    12/27/22 1444   BP: 110/85   Site: Left Wrist   Position: Sitting   Pulse: 64   Temp: 97.4 °F (36.3 °C)   TempSrc: Infrared   SpO2: 99%   Weight: 145 lb (65.8 kg)   Height: 5' 4\" (1.626 m)       Physical Exam  Constitutional:       General: She is not in acute distress. Appearance: She is well-developed. HENT:      Head: Normocephalic and atraumatic. Right Ear: Ear canal and external ear normal. No drainage. No middle ear effusion. Tympanic membrane is scarred. Tympanic membrane is not bulging. Tympanic membrane has normal mobility. Left Ear: Ear canal and external ear normal. No drainage. No middle ear effusion. Tympanic membrane is scarred. Tympanic membrane is not bulging. Tympanic membrane has normal mobility. Nose: No mucosal edema or rhinorrhea. Right Turbinates: Swollen. Left Turbinates: Swollen. Mouth/Throat:      Lips: Pink. Mouth: Mucous membranes are moist.      Tongue: No lesions. Palate: No mass. Pharynx: Uvula midline. Tonsils: 2+ on the right. 2+ on the left. Comments: Significant asthenia, strain noted, no hoarseness or breathiness  Eyes:      Pupils: Pupils are equal, round, and reactive to light. Neck:      Thyroid: No thyroid mass or thyromegaly. Trachea: Trachea and phonation normal.   Cardiovascular:      Pulses: Normal pulses. Pulmonary:      Effort: Pulmonary effort is normal. No accessory muscle usage or respiratory distress. Breath sounds: No stridor. Musculoskeletal:      Cervical back: Full passive range of motion without pain. Lymphadenopathy:      Head:      Right side of head: No submental or submandibular adenopathy. Left side of head: No submental or submandibular adenopathy. Cervical: No cervical adenopathy. Right cervical: No superficial, deep or posterior cervical adenopathy. Left cervical: No superficial, deep or posterior cervical adenopathy.    Skin:     General: Skin is warm and dry. Neurological:      Mental Status: She is alert and oriented to person, place, and time. Cranial Nerves: No cranial nerve deficit. Coordination: Coordination normal.      Gait: Gait normal.   Psychiatric:         Thought Content: Thought content normal.     Flexible Laryngoscopy    Pre op: Hoarseness  Post op: Muscle tension dysphonia  Procedure : Flexible Nasopharyngolaryngoscopy  Surgeon: ESSIE Javier  Anesthesia: Afrin with 2% lidocaine  Estimated Blood Loss: None    Procedure:   Flexible Laryngoscopy     After obtaining consent, the patient was placed in the examination chair in the upright position. Decongestant and topical anesthetic was sprayed in the After allowing adequate time for hemostatic effect, the flexible 4 mm laryngoscope was passed via the bilateral nasal passage    Nasal Septum: No acute septal deviation, no septal hematoma or perforations noted   Nasal Findings: No active hemorrhage, no nasal masses appreciated   Nasopharynx: Clear, no masses or inflammation  Oropharynx: Bilateral tonsillar tissue without exophytic masses, no exophytic masses  Base of Tongue: Lingual tonsils within normal limits, vallecula without effacement  Epiglottis: Upright, in normal anatomical position  True Vocal Cord: Anatomically normal, no masses or inflammation, normal abduction and adduction however significant supraglottic squeeze on phonation with asthenia  False Vocal Cord: normal   Hypopharynx Mucosa: No masses or inflammation of the piriform sinuses or postcricoid area  Arytenoids: Normal mucosa, no dislocation appreciated     * Patient tolerated the procedure well with no complications   * Patient was instructed not to eat for 30 minutes following procedure. * Patient was instructed that they may notice minor bleeding. Attestation:   I was present for the entire viewing, including introduction and removal of the scope. Assessment and Plan      Diagnosis Orders   1. Hoarseness of voice  predniSONE (DELTASONE) 20 MG tablet    170[address] - [place] LARYNGOSCOPY,FLEX FIBER,DIAGNOSTIC      2. Acute rhinosinusitis  fluticasone (FLONASE) 50 MCG/ACT nasal spray    doxycycline hyclate (VIBRA-TABS) 100 MG tablet    Hypertonic Nasal Wash (SINUS RINSE REFILL) PACK      3. Laryngopharyngeal reflux (LPR)  omeprazole (PRILOSEC) 20 MG delayed release capsule        Patient with concern for acute rhinosinusitis with hoarseness of voice oropharyngeal, on examination she has thickened asthenia and strain, and nasopharyngolaryngoscopy shows nasopharyngeal reflux along with significant muscle tension dysphonia. Although we do not appreciate at this visit, she is complained of purulent green rhinorrhea for the past few days, consistent with acute rhinosinusitis we will start her on antibiotics, sinus rinses for this. We will also start her on a short course of steroids which may help with sinonasal inflammation, and we will start her on omeprazole twice daily for antireflux medications. She may benefit from voice therapy if no improvement in muscle tension dysphonia, and we will see her back in 8 weeks. Return in about 2 months (around 2/27/2023). Portions of this note were dictated using Dragon.  There may be linguistic errors secondary to the use of this program.

## 2022-12-27 NOTE — PATIENT INSTRUCTIONS
-Start sinus rinses twice daily along with flonase as below: Instructions for Sinus Rinses/Nasal Sprays  -Start hypertonic nasal saline rinses saline twice daily - use distilled water, or water that you have boiled (and that has cooled to room temperature). Use a salt packet in the sinus rinse bottle.  When performing rinses, please stand over your sink with your chin tucked into your chest. Use 4oz of the bottle in the left nasal passage, and 4oz in the right nasal passage  -Start topical nasal steroid sprays twice daily 15-20 minutes after sinus rinses - again, tuck chin to chest when using sprays and place the spray tip of the bottle in the nose, with the tip oriented toward the ceiling     -Start antibiotics as prescribed  -Start antireflux medication as prescribed  -Call if no improvement in 2-3 weeks

## 2023-01-24 ENCOUNTER — TELEMEDICINE (OUTPATIENT)
Dept: INTERNAL MEDICINE CLINIC | Age: 38
End: 2023-01-24
Payer: COMMERCIAL

## 2023-01-24 ENCOUNTER — TELEPHONE (OUTPATIENT)
Dept: FAMILY MEDICINE CLINIC | Age: 38
End: 2023-01-24

## 2023-01-24 ENCOUNTER — NURSE TRIAGE (OUTPATIENT)
Dept: OTHER | Facility: CLINIC | Age: 38
End: 2023-01-24

## 2023-01-24 DIAGNOSIS — J06.9 UPPER RESPIRATORY INFECTION WITH COUGH AND CONGESTION: Primary | ICD-10-CM

## 2023-01-24 PROCEDURE — 99213 OFFICE O/P EST LOW 20 MIN: CPT | Performed by: INTERNAL MEDICINE

## 2023-01-24 RX ORDER — AZITHROMYCIN 250 MG/1
250 TABLET, FILM COATED ORAL SEE ADMIN INSTRUCTIONS
Qty: 6 TABLET | Refills: 0 | Status: SHIPPED | OUTPATIENT
Start: 2023-01-24 | End: 2023-01-29

## 2023-01-24 SDOH — ECONOMIC STABILITY: FOOD INSECURITY: WITHIN THE PAST 12 MONTHS, THE FOOD YOU BOUGHT JUST DIDN'T LAST AND YOU DIDN'T HAVE MONEY TO GET MORE.: NEVER TRUE

## 2023-01-24 SDOH — ECONOMIC STABILITY: FOOD INSECURITY: WITHIN THE PAST 12 MONTHS, YOU WORRIED THAT YOUR FOOD WOULD RUN OUT BEFORE YOU GOT MONEY TO BUY MORE.: NEVER TRUE

## 2023-01-24 ASSESSMENT — PATIENT HEALTH QUESTIONNAIRE - PHQ9
SUM OF ALL RESPONSES TO PHQ QUESTIONS 1-9: 0
SUM OF ALL RESPONSES TO PHQ9 QUESTIONS 1 & 2: 0
2. FEELING DOWN, DEPRESSED OR HOPELESS: 0
SUM OF ALL RESPONSES TO PHQ QUESTIONS 1-9: 0
SUM OF ALL RESPONSES TO PHQ QUESTIONS 1-9: 0
1. LITTLE INTEREST OR PLEASURE IN DOING THINGS: 0
SUM OF ALL RESPONSES TO PHQ QUESTIONS 1-9: 0

## 2023-01-24 ASSESSMENT — SOCIAL DETERMINANTS OF HEALTH (SDOH): HOW HARD IS IT FOR YOU TO PAY FOR THE VERY BASICS LIKE FOOD, HOUSING, MEDICAL CARE, AND HEATING?: NOT VERY HARD

## 2023-01-24 NOTE — TELEPHONE ENCOUNTER
Location of patient: [place]    Received call from Luis Eduardo Ribeiro at [facility] with [employer]. Subjective: Caller states \"sob\"     Current Symptoms: sob, cough cold symptoms comes and goes over the past 4 months has on/off congestion sometimes green mucus , sore throat , body aches was tested for strep and seen in December and again 2 weeks ago took a round of atb and prednisone no wheezing currently no medical issues     Pt is un established     Onset: 4 months    Associated Symptoms: NA    Pain Severity: none    Temperature: none       What has been tried: atb cold meds     LMP:  1 week ago  Pregnant: NA    Recommended disposition: Go to [facility]    Care advice provided, patient verbalizes understanding; denies any other questions or concerns; instructed to call back for any new or worsening symptoms. Patient/Caller agrees with recommended disposition; writer provided warm transfer to [facility] at [facility] for appointment scheduling    Attention Provider: Thank you for allowing me to participate in the care of your patient. The patient was connected to triage in response to information provided to the ECC/PSC. Please do not respond through this encounter as the response is not directed to a shared pool.          Reason for Disposition   MILD difficulty breathing (e.g., minimal/no SOB at rest, SOB with walking, pulse < 100) of new-onset or worse than normal    Protocols used: Breathing Difficulty-ADULT-[place]

## 2023-01-24 NOTE — TELEPHONE ENCOUNTER
----- Message from Zelda Ornelas sent at 1/24/2023  8:58 AM EST -----  Subject: Appointment Request    Reason for Call: New Patient/New to Provider Appointment needed: Immediate   Return from RN Triage    QUESTIONS    Reason for appointment request? Available appointments did not meet   patient need     Additional Information for Provider? patient needed to schedule a new   patient appointment. Transferred from [facility] stated she needed to be seen   today.  Offered patient several appointments she refused she wanted to be   seen at 3:30 she stated she would call back and try to schedule  ---------------------------------------------------------------------------  --------------  Cheryl GordonBryant INFO  [phone]; OK to leave message on voicemail  ---------------------------------------------------------------------------  --------------  SCRIPT ANSWERS  COVID Screen: Jackie Kim

## 2023-01-24 NOTE — PROGRESS NOTES
Pursuant to the emergency declaration under the [facility], [facility] authority and the [employer] and [employer] Act, this Virtual  Video Visit was conducted, with patient's consent, to reduce the patient's risk of exposure to COVID-19 and provide continuity of care. Service is  provided through a video synchronous discussion virtually to substitute for in-person clinic visit with the patient being at home and Dr. Sergio Cornejo being at home. Patient consent to the video visit. Date of Service:  1/24/2023    Chief Complaint:      Chief Complaint   Patient presents with    Congestion     Green nasal drainage    Cough    Other     Cold symptoms off and on since October    Pharyngitis       Assessment/Plan:    Rigoberto Rodríguez was seen today for congestion, cough, other and pharyngitis. Diagnoses and all orders for this visit:    Upper respiratory infection with cough and congestion  -     azithromycin (ZITHROMAX) 250 MG tablet; Take 1 tablet by mouth See Admin Instructions for 5 days 500mg on day 1 followed by 250mg on days 2 - 5  If you're still having congestion, buy some over the counter Mucinex 1200 mg or Mucinex DM 1200 mg (if coughing) 1 pill twice a day  to thin up your secretions so it can drain to relieve pressure in your face/ears. Return Fasting PE 2/20. HPI:  Adrianna AnnabellaMike Rice is a 40 y.o. She complain of green nasal discharge that started yesterday and then she starts to cough up similar discharge. No sore throat, myalgia, fever or chills. She complain of getting sick a few times since October and been on prednisone and antibiotics by Dr. Anjel Noble, Dr. Tonia Garza and Dr. Michael Mtz that she works with. No history of allergies. She's not on any over the counter cold medications currently.     Lab Results   Component Value Date    LABMICR Not Indicated 03/22/2016     Lab Results   Component Value Date     06/24/2021    K 3.8 06/24/2021     06/24/2021    CO2 23 06/24/2021    BUN 12 06/24/2021    CREATININE 0.6 06/24/2021    GLUCOSE 97 06/24/2021    CALCIUM 9.6 06/24/2021     Lab Results   Component Value Date/Time    CHOL 211 06/28/2021 09:29 AM    TRIG 289 06/28/2021 09:29 AM    HDL 36 06/28/2021 09:29 AM    LDLCALC 117 06/28/2021 09:29 AM     Lab Results   Component Value Date    ALT 14 06/24/2021    AST 12 (L) 06/24/2021     No results found for: TSH, T4FREE, T3FREE  Lab Results   Component Value Date    WBC 7.5 06/28/2021    HGB 14.2 06/28/2021    HCT 41.8 06/28/2021    MCV 91.0 06/28/2021     06/28/2021     No results found for: INR   No results found for: PSA   No results found for: [facility]     Patient Active Problem List   Diagnosis    Chronic abdominal pain    Adnexal cyst    Soft tissue mass    Mass of right axilla       Allergies   Allergen Reactions    Other      Pt states opioids cause her blood pressure to drop    Pcn [Penicillins]      Not sure      Outpatient Medications Marked as Taking for the 1/24/23 encounter (Telemedicine) with Mickey Argueta MD   Medication Sig Dispense Refill    fluticasone (FLONASE) 50 MCG/ACT nasal spray 1 spray by Each Nostril route in the morning and at bedtime 16 g 3    omeprazole (PRILOSEC) 20 MG delayed release capsule Take 1 capsule by mouth 2 times daily (before meals) 180 capsule 1    Hypertonic Nasal Wash (SINUS RINSE REFILL) PACK 1 packet by Nasal route 2 times daily 200 each 1         Review of Systems: 14 systems were negative except of what was stated on HPI    Nursing note and vitals reviewed. There were no vitals filed for this visit. Wt Readings from Last 3 Encounters:   12/27/22 145 lb (65.8 kg)   08/11/22 145 lb (65.8 kg)   04/14/22 145 lb (65.8 kg)     BP Readings from Last 3 Encounters:   12/27/22 110/85   04/14/22 109/73   06/28/21 122/66     There is no height or weight on file to calculate BMI. Constitutional: Patient appears well-developed and well-nourished. No distress. Head: Normocephalic and atraumatic. Psychiatric: Normal mood and affect.  Behavior is normal.

## 2023-01-24 NOTE — TELEPHONE ENCOUNTER
Patient was phoned and informed she was last seen by Dr Argueta on 6/28/21. She was given her number to schedule a virtual visit for her congestion.

## 2023-02-20 ENCOUNTER — OFFICE VISIT (OUTPATIENT)
Dept: INTERNAL MEDICINE CLINIC | Age: 38
End: 2023-02-20

## 2023-02-20 VITALS
DIASTOLIC BLOOD PRESSURE: 64 MMHG | HEART RATE: 65 BPM | SYSTOLIC BLOOD PRESSURE: 110 MMHG | WEIGHT: 139.2 LBS | OXYGEN SATURATION: 99 % | BODY MASS INDEX: 23.76 KG/M2 | HEIGHT: 64 IN

## 2023-02-20 DIAGNOSIS — J98.09 BRONCHOSPASTIC AIRWAY DISEASE: ICD-10-CM

## 2023-02-20 DIAGNOSIS — Z00.00 ENCOUNTER FOR WELL ADULT EXAM WITHOUT ABNORMAL FINDINGS: Primary | ICD-10-CM

## 2023-02-20 LAB
A/G RATIO: 1.8 (ref 1.1–2.2)
ALBUMIN SERPL-MCNC: 4.4 G/DL (ref 3.4–5)
ALP BLD-CCNC: 60 U/L (ref 40–129)
ALT SERPL-CCNC: 12 U/L (ref 10–40)
ANION GAP SERPL CALCULATED.3IONS-SCNC: 13 MMOL/L (ref 3–16)
AST SERPL-CCNC: 13 U/L (ref 15–37)
BASOPHILS ABSOLUTE: 0 K/UL (ref 0–0.2)
BASOPHILS RELATIVE PERCENT: 0.4 %
BILIRUB SERPL-MCNC: 0.3 MG/DL (ref 0–1)
BUN BLDV-MCNC: 11 MG/DL (ref 7–20)
CALCIUM SERPL-MCNC: 9.3 MG/DL (ref 8.3–10.6)
CHLORIDE BLD-SCNC: 102 MMOL/L (ref 99–110)
CHOLESTEROL, TOTAL: 266 MG/DL (ref 0–199)
CO2: 22 MMOL/L (ref 21–32)
CREAT SERPL-MCNC: 0.8 MG/DL (ref 0.6–1.1)
EOSINOPHILS ABSOLUTE: 0.4 K/UL (ref 0–0.6)
EOSINOPHILS RELATIVE PERCENT: 6.6 %
GFR SERPL CREATININE-BSD FRML MDRD: >60 ML/MIN/{1.73_M2}
GLUCOSE BLD-MCNC: 95 MG/DL (ref 70–99)
HCT VFR BLD CALC: 42.7 % (ref 36–48)
HDLC SERPL-MCNC: 58 MG/DL (ref 40–60)
HEMOGLOBIN: 14.1 G/DL (ref 12–16)
LDL CHOLESTEROL CALCULATED: 181 MG/DL
LYMPHOCYTES ABSOLUTE: 1.6 K/UL (ref 1–5.1)
LYMPHOCYTES RELATIVE PERCENT: 26.2 %
MCH RBC QN AUTO: 30.2 PG (ref 26–34)
MCHC RBC AUTO-ENTMCNC: 33 G/DL (ref 31–36)
MCV RBC AUTO: 91.4 FL (ref 80–100)
MONOCYTES ABSOLUTE: 0.3 K/UL (ref 0–1.3)
MONOCYTES RELATIVE PERCENT: 4.5 %
NEUTROPHILS ABSOLUTE: 3.8 K/UL (ref 1.7–7.7)
NEUTROPHILS RELATIVE PERCENT: 62.3 %
PDW BLD-RTO: 12.9 % (ref 12.4–15.4)
PLATELET # BLD: 232 K/UL (ref 135–450)
PMV BLD AUTO: 8.8 FL (ref 5–10.5)
POTASSIUM SERPL-SCNC: 4.1 MMOL/L (ref 3.5–5.1)
RBC # BLD: 4.68 M/UL (ref 4–5.2)
SODIUM BLD-SCNC: 137 MMOL/L (ref 136–145)
TOTAL PROTEIN: 6.9 G/DL (ref 6.4–8.2)
TRIGL SERPL-MCNC: 133 MG/DL (ref 0–150)
VLDLC SERPL CALC-MCNC: 27 MG/DL
WBC # BLD: 6.1 K/UL (ref 4–11)

## 2023-02-20 RX ORDER — NORETHINDRONE AND ETHINYL ESTRADIOL 0.5-0.035
1 KIT ORAL DAILY
COMMUNITY
Start: 2022-02-08

## 2023-02-20 RX ORDER — ALBUTEROL SULFATE 90 UG/1
2 AEROSOL, METERED RESPIRATORY (INHALATION) 4 TIMES DAILY PRN
Qty: 18 G | Refills: 0 | Status: SHIPPED | OUTPATIENT
Start: 2023-02-20

## 2023-02-20 SDOH — ECONOMIC STABILITY: INCOME INSECURITY: HOW HARD IS IT FOR YOU TO PAY FOR THE VERY BASICS LIKE FOOD, HOUSING, MEDICAL CARE, AND HEATING?: NOT HARD AT ALL

## 2023-02-20 SDOH — ECONOMIC STABILITY: FOOD INSECURITY: WITHIN THE PAST 12 MONTHS, YOU WORRIED THAT YOUR FOOD WOULD RUN OUT BEFORE YOU GOT MONEY TO BUY MORE.: NEVER TRUE

## 2023-02-20 SDOH — ECONOMIC STABILITY: FOOD INSECURITY: WITHIN THE PAST 12 MONTHS, THE FOOD YOU BOUGHT JUST DIDN'T LAST AND YOU DIDN'T HAVE MONEY TO GET MORE.: NEVER TRUE

## 2023-02-20 SDOH — ECONOMIC STABILITY: HOUSING INSECURITY
IN THE LAST 12 MONTHS, WAS THERE A TIME WHEN YOU DID NOT HAVE A STEADY PLACE TO SLEEP OR SLEPT IN A SHELTER (INCLUDING NOW)?: NO

## 2023-02-20 NOTE — LETTER
[address], [address] [place] [address] Drive [address]  Phone: [phone]  Fax: [phone]    Rj Smith MD        February 20, 2023     Patient: Jeimy Vinson   YOB: 1985   Date of Visit: 2/20/2023       To Whom It May Concern: It is my medical opinion that Aleksey Cons may return to full duty immediately with no restrictions. If you have any questions or concerns, please don't hesitate to call.     Sincerely,        Rj Smith MD

## 2023-02-20 NOTE — PATIENT INSTRUCTIONS
Well Visit, Ages 25 to 72: Care Instructions  Well visits can help you stay healthy. Your doctor has checked your overall health and may have suggested ways to take good care of yourself. Your doctor also may have recommended tests. You can help prevent illness with healthy eating, good sleep, vaccinations, regular exercise, and other steps. Get the tests that you and your doctor decide on. Depending on your age and risks, examples might include screening for diabetes; hepatitis C; HIV; and cervical, breast, lung, and colon cancer. Screening helps find diseases before any symptoms appear. Eat healthy foods. Choose fruits, vegetables, whole grains, lean protein, and low-fat dairy foods. Limit saturated fat and reduce salt. Limit alcohol. Men should have no more than 2 drinks a day. Women should have no more than 1. For some people, no alcohol is the best choice. Exercise. Get at least 30 minutes of exercise on most days of the week. Walking can be a good choice. Reach and stay at your healthy weight. This will lower your risk for many health problems. Take care of your mental health. Try to stay connected with friends, family, and community, and find ways to manage stress. If you're feeling depressed or hopeless, talk to someone. A counselor can help. If you don't have a counselor, talk to your doctor. Talk to your doctor if you think you may have a problem with alcohol or drug use. This includes prescription medicines and illegal drugs. Avoid tobacco and nicotine: Don't smoke, vape, or chew. If you need help quitting, talk to your doctor. Practice safer sex. Getting tested, using condoms or dental dams, and limiting sex partners can help prevent STIs. Use birth control if it's important to you to prevent pregnancy. Talk with your doctor about your choices and what might be best for you. Prevent problems where you can.  Protect your skin from too much sun, wash your hands, brush your teeth twice a day, and wear a seat belt in the car. Where can you learn more? Go to http://www.garcia.com/ and enter P072 to learn more about \"Well Visit, Ages 25 to 72: Care Instructions. \"  Current as of: March 9, 2022               Content Version: 13.5  © 3837-0586 Healthwise, Incorporated. Care instructions adapted under license by [facility] ([facility]). If you have questions about a medical condition or this instruction, always ask your healthcare professional. [address] any warranty or liability for your use of this information.

## 2023-02-20 NOTE — PROGRESS NOTES
[facility]  History and Physical  Terrance Mena M.D. Esther Aaron  YOB: 1985    Date of Service:  2/20/2023    Chief Complaint:   Esther Gamino. Lorelei Aaron is a 40 y.o. female who presents for   Chief Complaint   Patient presents with    Annual Exam     Physical       Assessment/Plan:    Will Cornell was seen today for annual exam.    Diagnoses and all orders for this visit:    Encounter for well adult exam without abnormal findings  -     Lipid Panel  -     Comprehensive Metabolic Panel  -     CBC with Auto Differential    Bronchospastic airway disease  Start albuterol sulfate HFA (VENTOLIN HFA) 108 (90 Base) MCG/ACT inhaler; Inhale 2 puffs into the lungs 4 times daily as needed for Wheezing      Return Fasting PE 2/19. HPI: Here for Annual Physical and Follow up. She was sick a lot from Oct to December and wanted a check up to make sure everything is ok. She's fine now and doing well. She still cough a little with talking too much or exercising.   She's never been on an inhaler in the past.    Lab Results   Component Value Date    LABMICR Not Indicated 03/22/2016     Lab Results   Component Value Date     06/24/2021    K 3.8 06/24/2021     06/24/2021    CO2 23 06/24/2021    BUN 12 06/24/2021    CREATININE 0.6 06/24/2021    GLUCOSE 97 06/24/2021    CALCIUM 9.6 06/24/2021     Lab Results   Component Value Date/Time    CHOL 211 06/28/2021 09:29 AM    TRIG 289 06/28/2021 09:29 AM    HDL 36 06/28/2021 09:29 AM    LDLCALC 117 06/28/2021 09:29 AM     Lab Results   Component Value Date    ALT 14 06/24/2021    AST 12 (L) 06/24/2021     No results found for: TSH, T4FREE, T3FREE  Lab Results   Component Value Date    WBC 7.5 06/28/2021    HGB 14.2 06/28/2021    HCT 41.8 06/28/2021    MCV 91.0 06/28/2021     06/28/2021     No results found for: INR   No results found for: PSA   No results found for: [facility]     Wt Readings from Last 3 Encounters:   02/20/23 139 lb 3.2 oz (63.1 kg)   12/27/22 145 lb (65.8 kg)   08/11/22 145 lb (65.8 kg)     BP Readings from Last 3 Encounters:   02/20/23 110/64   12/27/22 110/85   04/14/22 109/73       Patient Active Problem List   Diagnosis    Chronic abdominal pain    Adnexal cyst    Soft tissue mass    Mass of right axilla       Allergies   Allergen Reactions    Other      Pt states opioids cause her blood pressure to drop    Pcn [Penicillins]      Not sure      Outpatient Medications Marked as Taking for the 2/20/23 encounter (Office Visit) with Sarah Del Cid MD   Medication Sig Dispense Refill    norethindrone-ethinyl estradiol (Myranda Jest 0.5/35, 28,) 0.5-35 MG-MCG per tablet Take 1 tablet by mouth daily         History reviewed. No pertinent past medical history.   Past Surgical History:   Procedure Laterality Date    ARM SURGERY Right 5/19/2021    EXCISION SOFT TISSUE MASS RIGHT AXILLA performed by Landry Bhandari MD at [facility] OR     Family History   Problem Relation Age of Onset    Cancer Mother         uterine     High Cholesterol Mother     Other Sister         Azul Dials     High Cholesterol Sister      Social History     Socioeconomic History    Marital status: Single     Spouse name: Not on file    Number of children: Not on file    Years of education: Not on file    Highest education level: Not on file   Occupational History    Not on file   Tobacco Use    Smoking status: Never    Smokeless tobacco: Never   Vaping Use    Vaping Use: Never used   Substance and Sexual Activity    Alcohol use: Not Currently    Drug use: No    Sexual activity: Never   Other Topics Concern    Not on file   Social History Narrative    Not on file     Social Determinants of Health     Financial Resource Strain: Low Risk     Difficulty of Paying Living Expenses: Not hard at all   Food Insecurity: No Food Insecurity    Worried About Running Out of Food in the Last Year: Never true    [address] in the Last Year: Never true   Transportation Needs: Unknown    Lack of Transportation (Medical): Not on file    Lack of Transportation (Non-Medical): No   Physical Activity: Not on file   Stress: Not on file   Social Connections: Not on file   Intimate Partner Violence: Not on file   Housing Stability: Unknown    Unable to Pay for Housing in the Last Year: Not on file    Number of Places Lived in the Last Year: Not on file    Unstable Housing in the Last Year: No       Review of Systems:  A comprehensive review of systems was negative except for what was noted in the HPI. Physical Exam:   Vitals:    02/20/23 0747   BP: 110/64   Pulse: 65   SpO2: 99%   Weight: 139 lb 3.2 oz (63.1 kg)   Height: 5' 4\" (1.626 m)     Body mass index is 23.89 kg/m². Constitutional: She is oriented to person, place, and time. She appears well-developed and well-nourished. No distress. HEENT:   Head: Normocephalic and atraumatic. Right Ear: Tympanic membrane, external ear and ear canal normal.   Left Ear: Tympanic membrane, external ear and ear canal normal.   Mouth/Throat: Oropharynx is clear and moist, and mucous membranes are normal.  There is no cervical adenopathy. Eyes: Conjunctivae and extraocular motions are normal. Pupils are equal, round, and reactive to light. Neck: Supple. No JVD present. Carotid bruit is not present. No mass and no thyromegaly present. Cardiovascular: Normal rate, regular rhythm, normal heart sounds and intact distal pulses. Pulmonary/Chest: Effort normal and breath sounds normal. No respiratory distress. She has no wheezes, rhonchi or rales. Abdominal: Soft, non-tender. Bowel sounds and aorta are normal. She exhibits no organomegaly, mass or bruit. Musculoskeletal: Normal range of motion, no synovitis. She exhibits no edema. Neurological: She is alert and oriented to person, place, and time. She has normal reflexes. No cranial nerve deficit. Coordination normal.   Skin: Skin is warm and dry. There is no rash or erythema.   No suspicious lesions noted.        Preventive Care:  Health Maintenance   Topic Date Due    COVID-19 Vaccine (1) Never done    Flu vaccine (1) 02/20/2024 (Originally 8/1/2022)    Cervical cancer screen  02/20/2026 (Originally 6/29/2007)    Depression Screen  01/24/2024    DTaP/Tdap/Td vaccine (2 - Td or Tdap) 07/14/2030    Hepatitis A vaccine  Aged Out    Hib vaccine  Aged Out    Meningococcal (ACWY) vaccine  Aged Out    Pneumococcal 0-64 years Vaccine  Aged Out    Varicella vaccine  Discontinued    Hepatitis C screen  Discontinued    HIV screen  Discontinued        Recommendations for Preventive Services Due: see orders and patient instructions/AVS.

## 2023-03-08 ENCOUNTER — TELEMEDICINE (OUTPATIENT)
Dept: INTERNAL MEDICINE CLINIC | Age: 38
End: 2023-03-08
Payer: COMMERCIAL

## 2023-03-08 DIAGNOSIS — J06.9 URI WITH COUGH AND CONGESTION: Primary | ICD-10-CM

## 2023-03-08 PROCEDURE — 99213 OFFICE O/P EST LOW 20 MIN: CPT | Performed by: INTERNAL MEDICINE

## 2023-03-08 RX ORDER — AZITHROMYCIN 250 MG/1
250 TABLET, FILM COATED ORAL SEE ADMIN INSTRUCTIONS
Qty: 6 TABLET | Refills: 0 | Status: SHIPPED | OUTPATIENT
Start: 2023-03-08 | End: 2023-03-13

## 2023-03-08 NOTE — PROGRESS NOTES
Pursuant to the emergency declaration under the [facility], [facility] waiver authority and the [employer] and [employer] Act, this Virtual  Video Visit was conducted, with patient's consent, to reduce the patient's risk of exposure to COVID-19 and provide continuity of care. Service is  provided through a video synchronous discussion virtually to substitute for in-person clinic visit with the patient being at home and Dr. Amirah Carranza being at home. Patient consent to the video visit. Date of Service:  3/8/2023    Chief Complaint:      Chief Complaint   Patient presents with    Cough    Pharyngitis       Assessment/Plan:    Jaylin He was seen today for cough and pharyngitis. Diagnoses and all orders for this visit:    URI with cough and congestion  -     azithromycin (ZITHROMAX) 250 MG tablet; Take 1 tablet by mouth See Admin Instructions for 5 days 500mg on day 1 followed by 250mg on days 2 - 5  If you're still having congestion, buy some over the counter Mucinex 1200 mg or Mucinex DM 1200 mg (if coughing) 1 pill twice a day  to thin up your secretions so it can drain to relieve pressure in your face/ears. Continue albuterol prn    Return if symptoms worsen or fail to improve. HPI:  Mihai Stanley. Luis Antonio Florence is a 40 y.o. She complain of worsening cough and sore throat. Phlegm has been green all day. Clear runny nose. There's a lot of pressure in her face and forehead. No myalgia, fever or chills.     Lab Results   Component Value Date    LABMICR Not Indicated 03/22/2016     Lab Results   Component Value Date     02/20/2023    K 4.1 02/20/2023     02/20/2023    CO2 22 02/20/2023    BUN 11 02/20/2023    CREATININE 0.8 02/20/2023    GLUCOSE 95 02/20/2023    CALCIUM 9.3 02/20/2023     Lab Results   Component Value Date/Time    CHOL 266 02/20/2023 07:57 AM    TRIG 133 02/20/2023 07:57 AM    HDL 58 02/20/2023 07:57 AM    LDLCALC 181 02/20/2023 07:57 AM     Lab Results   Component Value Date    ALT 12 02/20/2023    AST 13 (L) 02/20/2023     No results found for: TSH, T4FREE, T3FREE  Lab Results   Component Value Date    WBC 6.1 02/20/2023    HGB 14.1 02/20/2023    HCT 42.7 02/20/2023    MCV 91.4 02/20/2023     02/20/2023     No results found for: INR   No results found for: PSA   No results found for: [facility]     Patient Active Problem List   Diagnosis    Chronic abdominal pain    Adnexal cyst    Soft tissue mass    Mass of right axilla       Allergies   Allergen Reactions    Other      Pt states opioids cause her blood pressure to drop    Pcn [Penicillins]      Not sure      Outpatient Medications Marked as Taking for the 3/8/23 encounter (Telemedicine) with Baron Melinda Argueta MD   Medication Sig Dispense Refill    norethindrone-ethinyl estradiol ([facility] 0.5/35, 28,) 0.5-35 MG-MCG per tablet Take 1 tablet by mouth daily      albuterol sulfate HFA (VENTOLIN HFA) 108 (90 Base) MCG/ACT inhaler Inhale 2 puffs into the lungs 4 times daily as needed for Wheezing 18 g 0         Review of Systems: 14 systems were negative except of what was stated on HPI    Nursing note and vitals reviewed. There were no vitals filed for this visit. Wt Readings from Last 3 Encounters:   02/20/23 139 lb 3.2 oz (63.1 kg)   12/27/22 145 lb (65.8 kg)   08/11/22 145 lb (65.8 kg)     BP Readings from Last 3 Encounters:   02/20/23 110/64   12/27/22 110/85   04/14/22 109/73     There is no height or weight on file to calculate BMI. Constitutional: Patient appears well-developed and well-nourished. No distress. Head: Normocephalic and atraumatic. Psychiatric: Normal mood and affect.  Behavior is normal.

## 2023-03-13 ENCOUNTER — HOSPITAL ENCOUNTER (OUTPATIENT)
Dept: MRI IMAGING | Age: 38
Discharge: HOME | End: 2023-03-13
Payer: COMMERCIAL

## 2023-03-13 DIAGNOSIS — I67.1 CEREBRAL ANEURYSM, NONRUPTURED: ICD-10-CM

## 2023-03-13 PROCEDURE — 70544 MR ANGIOGRAPHY HEAD W/O DYE: CPT

## 2023-03-28 ENCOUNTER — TELEPHONE (OUTPATIENT)
Dept: OTOLARYNGOLOGY | Age: 38
End: 2023-03-28

## 2023-03-28 DIAGNOSIS — J31.0 CHRONIC RHINOSINUSITIS: Primary | ICD-10-CM

## 2023-03-28 DIAGNOSIS — J32.9 CHRONIC RHINOSINUSITIS: Primary | ICD-10-CM

## 2023-03-28 NOTE — TELEPHONE ENCOUNTER
Plan for culture of nasal/respiratory secretions with suspected chronic rhinosinusitis. Recommend starting sinus rinses twice daily and flonase 15 minutes thereafter. Will try course of antibiotics - has been on azithromycin, clindamycin; will start doxycycline.

## 2023-03-29 RX ORDER — DOXYCYCLINE HYCLATE 100 MG
100 TABLET ORAL 2 TIMES DAILY
Qty: 20 TABLET | Refills: 0 | Status: SHIPPED | OUTPATIENT
Start: 2023-03-29 | End: 2023-04-08

## 2023-03-29 RX ORDER — FLUTICASONE PROPIONATE 50 MCG
1 SPRAY, SUSPENSION (ML) NASAL 2 TIMES DAILY
Qty: 16 G | Refills: 3 | Status: SHIPPED | OUTPATIENT
Start: 2023-03-29

## 2023-04-02 LAB
BACTERIA SPEC AEROBE CULT: ABNORMAL
BACTERIA SPEC ANAEROBE CULT: ABNORMAL
GRAM STN SPEC: ABNORMAL
ORGANISM: ABNORMAL

## 2023-04-04 ENCOUNTER — TELEPHONE (OUTPATIENT)
Dept: ENT CLINIC | Age: 38
End: 2023-04-04

## 2023-04-04 NOTE — TELEPHONE ENCOUNTER
Call placed to patient, informed her of the results per Dr. Nahed Key. Patient states her Sinus pressure, pain, and drainage has decreased. Patient stated her symptoms started improving on day 2 of the antibiotics.

## 2023-04-04 NOTE — TELEPHONE ENCOUNTER
----- Message from Tae Gaston MD sent at 4/3/2023  9:54 AM EDT -----  Please call Ms. Miguel Wang and let her know that cultures returned Moraxella, a common pathogen that causes sinus disease.  Ask her if she's improving (she should be doing sinus rinses twice daily) and please document if she's continuing to have sinus pressure, pain or purulent nasal discharge.   ----- Message -----  From: Heather Ziegler Incoming Lab Results From [facility] (Ashley Betancourt)  Sent: 4/2/2023   2:19 PM EDT  To: Tae Gaston MD

## 2023-07-11 ENCOUNTER — TELEPHONE (OUTPATIENT)
Dept: ENT CLINIC | Age: 38
End: 2023-07-11

## 2023-07-11 DIAGNOSIS — J01.90 ACUTE SINUSITIS, RECURRENCE NOT SPECIFIED, UNSPECIFIED LOCATION: Primary | ICD-10-CM

## 2023-07-11 RX ORDER — DOXYCYCLINE HYCLATE 100 MG
100 TABLET ORAL 2 TIMES DAILY
Qty: 20 TABLET | Refills: 0 | Status: SHIPPED | OUTPATIENT
Start: 2023-07-11 | End: 2023-07-21

## 2023-07-11 NOTE — TELEPHONE ENCOUNTER
Patient called stating that she has been having green discolored drainage for and a week and a half. She states that she is taking a decongestant/expectorant. She is requesting a script for doxy sent to [facility].   Patient was last seen by Dr. 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

## 2023-07-18 ENCOUNTER — TELEPHONE (OUTPATIENT)
Dept: INTERNAL MEDICINE CLINIC | Age: 38
End: 2023-07-18

## 2023-07-18 NOTE — TELEPHONE ENCOUNTER
LM offering Wed 7-26 or Thursday 7-27 arriving around 11:50 with Pre-op forms. Asked for return call asap

## 2023-07-18 NOTE — TELEPHONE ENCOUNTER
----- Message from Mike Salazar MA sent at 7/18/2023  2:51 PM EDT -----  Subject: Appointment Request    Reason for Call: Established Patient Appointment needed: Routine Pre-Op    QUESTIONS    Reason for appointment request? Available appointments did not meet   patient need     Additional Information for Provider?  Pt is scheduled for cerebral aneurysm   angiogram 8/7 Dr. Desiree Grimm and needs a preop prior, she is off of work   7/25, 7/26, and 7/27, asking if any provider is available to see her on   any of these days for her preop.  ---------------------------------------------------------------------------  --------------  Samanta Posey Cisco  [phone]; OK to leave message on voicemail  ---------------------------------------------------------------------------  --------------  SCRIPT ANSWERS

## 2023-07-26 ENCOUNTER — OFFICE VISIT (OUTPATIENT)
Dept: INTERNAL MEDICINE CLINIC | Age: 38
End: 2023-07-26
Payer: COMMERCIAL

## 2023-07-26 VITALS
SYSTOLIC BLOOD PRESSURE: 98 MMHG | HEIGHT: 64 IN | DIASTOLIC BLOOD PRESSURE: 64 MMHG | HEART RATE: 83 BPM | WEIGHT: 136 LBS | OXYGEN SATURATION: 100 % | BODY MASS INDEX: 23.22 KG/M2

## 2023-07-26 DIAGNOSIS — I67.1 CEREBRAL ANEURYSM WITHOUT RUPTURE: Primary | ICD-10-CM

## 2023-07-26 DIAGNOSIS — Z01.818 PREOP EXAM FOR INTERNAL MEDICINE: ICD-10-CM

## 2023-07-26 PROCEDURE — 99213 OFFICE O/P EST LOW 20 MIN: CPT | Performed by: INTERNAL MEDICINE

## 2024-05-26 ENCOUNTER — HOSPITAL ENCOUNTER (EMERGENCY)
Age: 39
Discharge: HOME | End: 2024-05-26
Payer: COMMERCIAL

## 2024-05-26 VITALS
TEMPERATURE: 98 F | DIASTOLIC BLOOD PRESSURE: 99 MMHG | SYSTOLIC BLOOD PRESSURE: 137 MMHG | BODY MASS INDEX: 24.75 KG/M2 | WEIGHT: 145 LBS | RESPIRATION RATE: 14 BRPM | HEIGHT: 64 IN | OXYGEN SATURATION: 100 % | HEART RATE: 59 BPM

## 2024-05-26 DIAGNOSIS — H18.892 CORNEAL IRRITATION OF LEFT EYE: ICD-10-CM

## 2024-05-26 DIAGNOSIS — H10.45 OTHER CHRONIC ALLERGIC CONJUNCTIVITIS OF BOTH EYES: Primary | ICD-10-CM

## 2024-05-26 PROCEDURE — 99283 EMERGENCY DEPT VISIT LOW MDM: CPT

## 2024-05-26 RX ORDER — OLOPATADINE HYDROCHLORIDE 2 MG/ML
1 SOLUTION/ DROPS OPHTHALMIC DAILY
Qty: 5 ML | Refills: 0 | Status: SHIPPED | OUTPATIENT
Start: 2024-05-26

## 2024-05-26 ASSESSMENT — LIFESTYLE VARIABLES
HOW MANY STANDARD DRINKS CONTAINING ALCOHOL DO YOU HAVE ON A TYPICAL DAY: 1 OR 2
HOW OFTEN DO YOU HAVE A DRINK CONTAINING ALCOHOL: MONTHLY OR LESS

## 2024-05-26 ASSESSMENT — PAIN - FUNCTIONAL ASSESSMENT: PAIN_FUNCTIONAL_ASSESSMENT: 0-10

## 2024-05-26 ASSESSMENT — VISUAL ACUITY
OD: 20/30
OS: 20/25
OU: 20/25

## 2024-05-26 ASSESSMENT — PAIN SCALES - GENERAL: PAINLEVEL_OUTOF10: 9

## 2024-05-28 NOTE — ED PROVIDER NOTES
[facility]  ED  EMERGENCY DEPARTMENT ENCOUNTER        Pt Name: Ofelia Abraham  MRN: [id number]  Birthdate 1985  Date of evaluation: 2024  Provider: DEEPA Martins - JENNI  PCP: Deonna Argueta MD        LÁZARO. I have evaluated this patient.        CHIEF COMPLAINT       Chief Complaint   Patient presents with    Eye Pain     Complains of left eye burning upon waking, vision difficulty       HISTORY OF PRESENT ILLNESS: 1 or more Elements     History From: the patient  Limitations to history : None    Ofelia Abraham is a 38 y.o. female who presents to the emerged from today with complaints of eye irritation, patient states that she has both eyes irritated when she woke up this morning, states that the left eye is worse.  Patient states that it hurts to open her eye.  Denies any actual globe pain.  Denies trauma.  Denies any foreign body.  She is here for further evaluation.    Nursing Notes were all reviewed and agreed with or any disagreements were addressed in the HPI.    REVIEW OF SYSTEMS :      Review of Systems    Positives and Pertinent negatives as per HPI.     SURGICAL HISTORY     Past Surgical History:   Procedure Laterality Date    ARM SURGERY Right 2021    EXCISION SOFT TISSUE MASS RIGHT AXILLA performed by Alli Bowens MD at [facility] OR     SECTION  2020       CURRENTMEDICATIONS       Discharge Medication List as of 2024 12:33 PM        CONTINUE these medications which have NOT CHANGED    Details   norethindrone-ethinyl estradiol (NECON 0.5/35, 28,) 0.5-35 MG-MCG per tablet Take 1 tablet by mouth dailyHistorical Med             ALLERGIES     Other and Pcn [penicillins]    FAMILYHISTORY       Family History   Problem Relation Age of Onset    Cancer Mother         HPV had uterus revoved    High Cholesterol Mother     Other Sister         Alireza Cuatelos     High Cholesterol Sister         SOCIAL HISTORY       Social History     Tobacco Use

## 2024-11-20 ENCOUNTER — OFFICE VISIT (OUTPATIENT)
Dept: INTERNAL MEDICINE CLINIC | Age: 39
End: 2024-11-20

## 2024-11-20 VITALS
HEART RATE: 88 BPM | BODY MASS INDEX: 26.46 KG/M2 | HEIGHT: 64 IN | WEIGHT: 155 LBS | SYSTOLIC BLOOD PRESSURE: 122 MMHG | OXYGEN SATURATION: 99 % | DIASTOLIC BLOOD PRESSURE: 70 MMHG | TEMPERATURE: 97.5 F

## 2024-11-20 DIAGNOSIS — Z00.00 ENCOUNTER FOR PREVENTIVE HEALTH EXAMINATION: Primary | ICD-10-CM

## 2024-11-20 DIAGNOSIS — E78.00 HYPERCHOLESTEREMIA: ICD-10-CM

## 2024-11-20 DIAGNOSIS — J06.9 URI WITH COUGH AND CONGESTION: ICD-10-CM

## 2024-11-20 RX ORDER — AZITHROMYCIN 250 MG/1
TABLET, FILM COATED ORAL
Qty: 6 TABLET | Refills: 0 | Status: SHIPPED | OUTPATIENT
Start: 2024-11-20 | End: 2024-11-30

## 2024-11-20 SDOH — ECONOMIC STABILITY: INCOME INSECURITY: HOW HARD IS IT FOR YOU TO PAY FOR THE VERY BASICS LIKE FOOD, HOUSING, MEDICAL CARE, AND HEATING?: NOT HARD AT ALL

## 2024-11-20 SDOH — ECONOMIC STABILITY: FOOD INSECURITY: WITHIN THE PAST 12 MONTHS, THE FOOD YOU BOUGHT JUST DIDN'T LAST AND YOU DIDN'T HAVE MONEY TO GET MORE.: NEVER TRUE

## 2024-11-20 SDOH — ECONOMIC STABILITY: FOOD INSECURITY: WITHIN THE PAST 12 MONTHS, YOU WORRIED THAT YOUR FOOD WOULD RUN OUT BEFORE YOU GOT MONEY TO BUY MORE.: NEVER TRUE

## 2024-11-20 ASSESSMENT — PATIENT HEALTH QUESTIONNAIRE - PHQ9
SUM OF ALL RESPONSES TO PHQ QUESTIONS 1-9: 0
2. FEELING DOWN, DEPRESSED OR HOPELESS: NOT AT ALL
SUM OF ALL RESPONSES TO PHQ9 QUESTIONS 1 & 2: 0
1. LITTLE INTEREST OR PLEASURE IN DOING THINGS: NOT AT ALL

## 2024-11-20 NOTE — PROGRESS NOTES
Last 3 Encounters:   24 70.3 kg (155 lb)   24 65.8 kg (145 lb)   23 61.7 kg (136 lb)     BP Readings from Last 3 Encounters:   24 122/70   24 (!) 137/99   23 98/64       Patient Active Problem List   Diagnosis    Chronic abdominal pain    Adnexal cyst    Soft tissue mass    Mass of right axilla       Allergies   Allergen Reactions    Other      Pt states opioids cause her blood pressure to drop    Pcn [Penicillins]      Not sure      Outpatient Medications Marked as Taking for the 24 encounter (Office Visit) with Deonna Argueta MD   Medication Sig Dispense Refill    azithromycin (ZITHROMAX) 250 MG tablet 500mg on day 1 followed by 250mg on days 2 - 5 6 tablet 0       History reviewed. No pertinent past medical history.  Past Surgical History:   Procedure Laterality Date    ARM SURGERY Right 2021    EXCISION SOFT TISSUE MASS RIGHT AXILLA performed by Alli Bowens MD at [facility] [place]     SECTION  2020     Family History   Problem Relation Age of Onset    Cancer Mother         HPV had uterus revoved    High Cholesterol Mother     Other Sister         Alireza Ramos     High Cholesterol Sister      Social History     Socioeconomic History    Marital status: Single     Spouse name: Not on file    Number of children: Not on file    Years of education: Not on file    Highest education level: Not on file   Occupational History    Not on file   Tobacco Use    Smoking status: Never    Smokeless tobacco: Never   Vaping Use    Vaping status: Never Used   Substance and Sexual Activity    Alcohol use: Not Currently    Drug use: No    Sexual activity: Not Currently     Partners: Male   Other Topics Concern    Not on file   Social History Narrative    Not on file     Social Determinants of Health     Financial Resource Strain: Low Risk  (2024)    Overall Financial Resource Strain (CARDIA)     Difficulty of Paying Living Expenses: Not hard at all   Food Insecurity:

## 2024-11-26 ENCOUNTER — PATIENT MESSAGE (OUTPATIENT)
Dept: INTERNAL MEDICINE CLINIC | Age: 39
End: 2024-11-26

## 2025-02-14 ENCOUNTER — OFFICE VISIT (OUTPATIENT)
Dept: INTERNAL MEDICINE CLINIC | Age: 40
End: 2025-02-14

## 2025-02-14 VITALS
OXYGEN SATURATION: 99 % | HEIGHT: 64 IN | DIASTOLIC BLOOD PRESSURE: 68 MMHG | SYSTOLIC BLOOD PRESSURE: 108 MMHG | WEIGHT: 172 LBS | HEART RATE: 92 BPM | BODY MASS INDEX: 29.37 KG/M2

## 2025-02-14 DIAGNOSIS — Z76.89 ENCOUNTER TO ESTABLISH CARE: Primary | ICD-10-CM

## 2025-02-14 DIAGNOSIS — I67.1 CEREBRAL ANEURYSM WITHOUT RUPTURE: ICD-10-CM

## 2025-02-14 DIAGNOSIS — E78.2 MIXED HYPERLIPIDEMIA: ICD-10-CM

## 2025-02-14 DIAGNOSIS — M62.830 SPASM OF LEFT TRAPEZIUS MUSCLE: ICD-10-CM

## 2025-02-14 RX ORDER — AMOXICILLIN 500 MG/1
CAPSULE ORAL
COMMUNITY
Start: 2025-02-11

## 2025-02-14 SDOH — HEALTH STABILITY: PHYSICAL HEALTH: ON AVERAGE, HOW MANY DAYS PER WEEK DO YOU ENGAGE IN MODERATE TO STRENUOUS EXERCISE (LIKE A BRISK WALK)?: 7 DAYS

## 2025-02-14 NOTE — PROGRESS NOTES
Adam   2025    Ofelia Abraham (:  1985) is a 39 y.o. female, here for evaluation of the following medical concerns:    Chief Complaint   Patient presents with    Established New Doctor    OTHER     Received t he Dtap vaccine recently at [facility] [place] and had severe pain, swelling, glands swollen under arm and left shoulder area. Took benadryl. Still has muscle soreness. Nausea and fever.     Routine Prenatal Visit     34 weeks         ASSESSMENT/ PLAN  1. Encounter to establish care    2. Mixed hyperlipidemia  3. Cerebral aneurysm without rupture  Continue follow-up with neuro.  Repeat labs post delivery    4. Spasm of left trapezius muscle  Discussed with patient likely spasm of left trapezius muscle.  Exercises to be done at home discussed with patient.  Advised application of heat       HPI  Patient is a 39-year-old female with past medical history significant for cerebral aneurysm, hyperlipidemia.  She is currently pregnant and is planning for  delivery next month.  She denies any complications during her current pregnancy.  Denies any chest pain or shortness of breath.  She states after receiving DTaP injection she had severe pain and swelling and redness on her left arm which is since improved.  She denies any fevers.  She have not noticed some pain on her left shoulder and got concerned.  Noted pain is worse when moving the head around.  Denies any trauma.  On examination of her left arm there is no evidence of any current infectious etiology, she does not have any swelling, erythema or tenderness and I have reassured her.    ROS    CONSTITUTIONAL:  No fevers, chills, sweats or weight changes  EYES:  No redness or visual symptoms.  EARS, NOSE AND THROAT:  No difficulties with hearing.  No symptoms of rhinitis or sore throat.  CARDIOVASCULAR:  No chest pains, palpitations, orthopnea or paroxysmal noctunal dyspnea.  RESPIRATORY:  No dyspnea o exertion, wheezing or cough.  GI:  No

## (undated) DEVICE — GOWN,AURORA,NONREINF,RAGLAN,XXL,STERILE: Brand: MEDLINE

## (undated) DEVICE — NEEDLE HYPO 25GA L1.5IN BLU POLYPR HUB S STL REG BVL STR

## (undated) DEVICE — SUTURE VCRL SZ 3-0 L18IN ABSRB UD L26MM SH 1/2 CIR J864D

## (undated) DEVICE — MAJOR SET UP PK

## (undated) DEVICE — SOLUTION IV IRRIG 500ML 0.9% SODIUM CHL 2F7123

## (undated) DEVICE — ELECTRODE PT RET AD L9FT HI MOIST COND ADH HYDRGEL CORDED

## (undated) DEVICE — PACK,UNIVERSAL,SPLIT,II,AURORA: Brand: MEDLINE

## (undated) DEVICE — GAUZE,SPONGE,4"X4",8PLY,STRL,LF,10/TRAY: Brand: MEDLINE

## (undated) DEVICE — CANNULA NSL 13FT TUBE AD ETCO2 DIV SAMP M

## (undated) DEVICE — SYRINGE MED 10ML LUERLOCK TIP W/O SFTY DISP

## (undated) DEVICE — APPLICATOR PREP 26ML 0.7% IOD POVACRYLEX 74% ISO ALC ST

## (undated) DEVICE — GLOVE ORANGE PI 8 1/2   MSG9085

## (undated) DEVICE — TIP SUCT DIA12FR W STYL CTRL VENT DISPOSABLE FRAZ